# Patient Record
Sex: FEMALE | Race: WHITE | NOT HISPANIC OR LATINO | Employment: FULL TIME | URBAN - METROPOLITAN AREA
[De-identification: names, ages, dates, MRNs, and addresses within clinical notes are randomized per-mention and may not be internally consistent; named-entity substitution may affect disease eponyms.]

---

## 2017-02-09 ENCOUNTER — GENERIC CONVERSION - ENCOUNTER (OUTPATIENT)
Dept: OTHER | Facility: OTHER | Age: 57
End: 2017-02-09

## 2017-02-22 ENCOUNTER — ALLSCRIPTS OFFICE VISIT (OUTPATIENT)
Dept: OTHER | Facility: OTHER | Age: 57
End: 2017-02-22

## 2017-03-22 ENCOUNTER — GENERIC CONVERSION - ENCOUNTER (OUTPATIENT)
Dept: OTHER | Facility: OTHER | Age: 57
End: 2017-03-22

## 2017-05-08 ENCOUNTER — GENERIC CONVERSION - ENCOUNTER (OUTPATIENT)
Dept: OTHER | Facility: OTHER | Age: 57
End: 2017-05-08

## 2017-05-16 ENCOUNTER — ALLSCRIPTS OFFICE VISIT (OUTPATIENT)
Dept: OTHER | Facility: OTHER | Age: 57
End: 2017-05-16

## 2017-11-29 DIAGNOSIS — Z12.31 ENCOUNTER FOR SCREENING MAMMOGRAM FOR MALIGNANT NEOPLASM OF BREAST: ICD-10-CM

## 2017-12-27 ENCOUNTER — GENERIC CONVERSION - ENCOUNTER (OUTPATIENT)
Dept: OTHER | Facility: OTHER | Age: 57
End: 2017-12-27

## 2017-12-27 ENCOUNTER — LAB REQUISITION (OUTPATIENT)
Dept: LAB | Facility: HOSPITAL | Age: 57
End: 2017-12-27
Payer: COMMERCIAL

## 2017-12-27 DIAGNOSIS — E03.9 HYPOTHYROIDISM: ICD-10-CM

## 2017-12-27 LAB
ALBUMIN SERPL BCP-MCNC: 3.9 G/DL (ref 3.5–5)
ALP SERPL-CCNC: 41 U/L (ref 46–116)
ALT SERPL W P-5'-P-CCNC: 20 U/L (ref 12–78)
ANION GAP SERPL CALCULATED.3IONS-SCNC: 4 MMOL/L (ref 4–13)
AST SERPL W P-5'-P-CCNC: 11 U/L (ref 5–45)
BASOPHILS # BLD AUTO: 0.02 THOUSANDS/ΜL (ref 0–0.1)
BASOPHILS NFR BLD AUTO: 0 % (ref 0–1)
BILIRUB SERPL-MCNC: 0.62 MG/DL (ref 0.2–1)
BUN SERPL-MCNC: 11 MG/DL (ref 5–25)
CALCIUM SERPL-MCNC: 8.8 MG/DL (ref 8.3–10.1)
CHLORIDE SERPL-SCNC: 105 MMOL/L (ref 100–108)
CHOLEST SERPL-MCNC: 141 MG/DL (ref 50–200)
CO2 SERPL-SCNC: 30 MMOL/L (ref 21–32)
CREAT SERPL-MCNC: 0.69 MG/DL (ref 0.6–1.3)
EOSINOPHIL # BLD AUTO: 0.08 THOUSAND/ΜL (ref 0–0.61)
EOSINOPHIL NFR BLD AUTO: 1 % (ref 0–6)
ERYTHROCYTE [DISTWIDTH] IN BLOOD BY AUTOMATED COUNT: 13.2 % (ref 11.6–15.1)
GFR SERPL CREATININE-BSD FRML MDRD: 98 ML/MIN/1.73SQ M
GLUCOSE SERPL-MCNC: 80 MG/DL (ref 65–140)
HCT VFR BLD AUTO: 42.3 % (ref 34.8–46.1)
HDLC SERPL-MCNC: 67 MG/DL (ref 40–60)
HGB BLD-MCNC: 13.9 G/DL (ref 11.5–15.4)
LDLC SERPL CALC-MCNC: 62 MG/DL (ref 0–100)
LYMPHOCYTES # BLD AUTO: 1.02 THOUSANDS/ΜL (ref 0.6–4.47)
LYMPHOCYTES NFR BLD AUTO: 16 % (ref 14–44)
MCH RBC QN AUTO: 29.5 PG (ref 26.8–34.3)
MCHC RBC AUTO-ENTMCNC: 32.9 G/DL (ref 31.4–37.4)
MCV RBC AUTO: 90 FL (ref 82–98)
MONOCYTES # BLD AUTO: 0.48 THOUSAND/ΜL (ref 0.17–1.22)
MONOCYTES NFR BLD AUTO: 7 % (ref 4–12)
NEUTROPHILS # BLD AUTO: 4.88 THOUSANDS/ΜL (ref 1.85–7.62)
NEUTS SEG NFR BLD AUTO: 76 % (ref 43–75)
NRBC BLD AUTO-RTO: 0 /100 WBCS
PLATELET # BLD AUTO: 196 THOUSANDS/UL (ref 149–390)
PMV BLD AUTO: 12.7 FL (ref 8.9–12.7)
POTASSIUM SERPL-SCNC: 4 MMOL/L (ref 3.5–5.3)
PROT SERPL-MCNC: 6.8 G/DL (ref 6.4–8.2)
RBC # BLD AUTO: 4.71 MILLION/UL (ref 3.81–5.12)
SODIUM SERPL-SCNC: 139 MMOL/L (ref 136–145)
TRIGL SERPL-MCNC: 59 MG/DL
TSH SERPL DL<=0.05 MIU/L-ACNC: 0.77 UIU/ML (ref 0.36–3.74)
WBC # BLD AUTO: 6.5 THOUSAND/UL (ref 4.31–10.16)

## 2017-12-27 PROCEDURE — 80053 COMPREHEN METABOLIC PANEL: CPT | Performed by: FAMILY MEDICINE

## 2017-12-27 PROCEDURE — 85025 COMPLETE CBC W/AUTO DIFF WBC: CPT | Performed by: FAMILY MEDICINE

## 2017-12-27 PROCEDURE — 84443 ASSAY THYROID STIM HORMONE: CPT | Performed by: FAMILY MEDICINE

## 2017-12-27 PROCEDURE — 80061 LIPID PANEL: CPT | Performed by: FAMILY MEDICINE

## 2018-01-11 NOTE — MISCELLANEOUS
Message   Recorded as Task   Date: 06/22/2016 10:08 AM, Created By: [facility]   Task Name: Follow Up   Assigned To: Michelle Alvares   Regarding Patient: Olga Romero, Status: In Progress   Comment:    Ibis Espinosane - 22 Jun 2016 10:08 AM     TASK CREATED  spoke with pt    she is a pt of rk sched for lap vag hysterectomy 08/15    began last night with intense pelvic pain    has 14cm pedunculated fibroid    no c/o bleeding    paged ed    await further instructions    pt has an rx for post op percocet   Ibis Espinosane - 22 Jun 2016 10:08 AM     TASK IN PROGRESS   Mara Espinosa - 22 Jun 2016 10:20 AM     TASK EDITED  spoke with ed and explained the situation    she advised pt start the percocet and layer with motrin 800mg   if pain is not relieved and coontinues to be severe, to go to rellr/Mara Forde - 22 Jun 2016 10:21 AM     TASK REASSIGNED: Previously Assigned To BIJU YOU,Jessika CAMERON TO ED   Mara Espinosa - 22 Jun 2016 10:22 AM     TASK REASSIGNED: Previously Assigned To Anali Quinteros TO RK ON HIS RETURN   Neda Nyhan - 28 Jun 2016 9:49 AM     TASK EDITED  pt called back today- she has had diarrhea since sunday and didnt know if it could be related to her fibroid  please advise [phone]   Mara Espniosa - 28 Jun 2016 10:23 AM     TASK IN PROGRESS   Mara Espinosa - 28 Jun 2016 10:24 AM     TASK EDITED  lm for pt tcb   Ibis Espinosane - 28 Jun 2016 11:07 AM     TASK EDITED  spoke with pt    she was requesting an excuse for work due to diarrhea    referred pt to pcp        Active Problems    1  Acute pharyngitis (462) (J02 9)   2  Anxiety (300 00) (F41 9)   3  Arm paresthesia, right (782 0) (R20 2)   4  Cervical high risk human papillomavirus (HPV) DNA test positive (795 05) (R87 810)   5  Constipation (564 00) (K59 00)   6  Depression (311) (F32 9)   7  Dyspepsia (536 8) (K30)   8   Encounter for routine gynecological examination with Papanicolaou smear of cervix   (V72 31,V76 2) (Z01 419)   9  Encounter for screening colonoscopy (V76 51) (Z12 11)   10  Encounter for screening mammogram for malignant neoplasm of breast (V76 12)    (Z12 31)   11  Exposure to STD (V01 6) (Z20 2)   12  Fatigue (780 79) (R53 83)   13  Leiomyoma of uterus (218 9) (D25 9)   14  Low grade squamous intraepithelial lesion (LGSIL) on Papanicolaou smear of cervix    (795 03) (R87 612)   15  Migraine headache (346 90) (G43 909)   16  Primary hypothyroidism (244 9) (E03 9)   17  Screening for human papillomavirus (HPV) (V73 81) (Z11 51)   18  Symptomatic menopausal or female climacteric states (627 2) (N95 1)   19  Uterine fibroid (218 9) (D25 9)   20  Vaginal discharge (623 5) (N89 8)   21  Viral gastroenteritis (008 8) (A08 4)   22  Vulvovaginitis candida albicans (112 1) (B37 3)    Current Meds   1  Black Cohosh CAPS; Therapy: (Recorded:12Oct2015) to Recorded   2  Butalbital-APAP-Caffeine -40 MG Oral Capsule; Take one capsule every 4 to 6   hours prn  Requested for: 60Pnc7299; Last Rx:44Nsw8791 Ordered   3  Fluticasone Propionate 50 MCG/ACT Nasal Suspension; USE 2 SPRAYS IN EACH   NOSTRIL DAILY; Therapy: 09NMO7142 to (Edmundo Ray)  Requested for: 51Ohl1448 Ordered   4  KlonoPIN 0 5 MG Oral Tablet (ClonazePAM); take 4 tablets at bedtime; Therapy: (Recorded:82Efz5748) to Recorded   5  Minivelle 0 05 MG/24HR Transdermal Patch Twice Weekly; APPLY 1 PATCH TWICE   WEEKLY AS DIRECTED; Therapy: 03Sga1107 to (Joi Ledesma)  Requested for: 77WLA4785; Last   Rx:01Jun2016 Ordered   6  Mupirocin Calcium 2 % External Cream (Bactroban); APPLY AND GENTLY MASSAGE   INTO AFFECTED AREA(S) TWICE DAILY  Requested for: 74Xyq1726; Last   Rx:16Ugb1535 Ordered   7  Norethindrone Acetate 5 MG Oral Tablet; one half tablet at bedtime; Therapy: 20Lvy9634 to (Verito Peterson)  Requested for: 52WQT1672; Last   Rx:07Jun2016 Ordered   8   Ondansetron 8 MG Oral Tablet Dispersible; TAKE 1 TABLET Every 8 hours PRN nausea; Therapy: 02WAY4088 to (Last Rx:24Mar2015); Status: ACTIVE - Renewal Voided Ordered   9  Synthroid 112 MCG Oral Tablet (Levothyroxine Sodium); Take 1 tablet daily; Therapy: 69NHJ0607 to (Evaluate:09Jan2017)  Requested for: 26WXF6153; Last   Rx:15Jan2016 Ordered   10  TraMADol HCl - 50 MG Oral Tablet; TAKE 1 TABLET EVERY 6 HOURS AS NEEDED FOR    PAIN;    Therapy: 50HGB5553 to (Evaluate:24Mar2015); Last Rx:16Mar2015 Ordered   11  Vyvanse 70 MG Oral Capsule Recorded    Allergies    1   No Known Drug Allergies    Signatures   Electronically signed by : Georgina Mayo, ; Jun 28 2016 11:07AM EST                       (Author)

## 2018-01-11 NOTE — MISCELLANEOUS
Message   Recorded as Task   Date: 11/28/2016 12:24 PM, Created By: Jake Coffman   Task Name: Care Coordination   Assigned To: Mami Tracy   Regarding Patient: Brett Mendez, Status: Active   CommentMichelle Lombardi - 28 Nov 2016 12:24 PM     TASK CREATED  Caller: Self; Care Coordination; [phone] (Home)  pt called - has her mammo schd for tomorrow 11/29 @1040am - needs to have a mammo rx faxed to [facility] [phone] - she needs to have this mammo tomorrow - as of 12/1 she will not have insurance  please advise  [address] [place] [address] [place] - 28 Nov 2016 12:54 PM     TASK EDITED  order in allscripts        Active Problems    1  Acute pharyngitis (462) (J02 9)   2  Anxiety (300 00) (F41 9)   3  Arm paresthesia, right (782 0) (R20 2)   4  Cervical high risk human papillomavirus (HPV) DNA test positive (795 05) (R87 810)   5  Constipation (564 00) (K59 00)   6  Contact dermatitis (692 9) (L25 9)   7  Depression (311) (F32 9)   8  Dyspepsia (536 8) (K30)   9  Dysuria (788 1) (R30 0)   10  Encounter for routine gynecological examination with Papanicolaou smear of cervix    (V72 31,V76 2) (Z01 419)   11  Encounter for screening colonoscopy (V76 51) (Z12 11)   12  Encounter for screening mammogram for malignant neoplasm of breast (V76 12)    (Z12 31)   13  Exposure to STD (V01 6) (Z20 2)   14  Fatigue (780 79) (R53 83)   15  Fibroid, uterine (218 9) (D25 9)   16  Gastroenteritis (558 9) (K52 9)   17  Low grade squamous intraepithelial lesion (LGSIL) on Papanicolaou smear of cervix    (795 03) (R87 612)   18  Migraine headache (346 90) (G43 909)   19  Primary hypothyroidism (244 9) (E03 9)   20  Rash (782 1) (R21)   21  Screening for human papillomavirus (HPV) (V73 81) (Z11 51)   22  Symptomatic menopausal or female climacteric states (627 2) (N95 1)   23  Uterine fibroid (218 9) (D25 9)   24  UTI (urinary tract infection) (599 0) (N39 0)   25  Vaginal discharge (623 5) (N89 8)   26   Viral gastroenteritis (008  8) (A08 4)   27  Vulvovaginitis candida albicans (112 1) (B37 3)    Current Meds   1  Butalbital-APAP-Caffeine -40 MG Oral Capsule; Take one capsule every 4 to 6   hours prn  Requested for: 95Hzv4285; Last Rx:82Lcg1671 Ordered   2  Fluticasone Propionate 50 MCG/ACT Nasal Suspension; USE 2 SPRAYS IN EACH   NOSTRIL DAILY; Therapy: 47ORP9985 to (Last Rx:21Suh0027)  Requested for: 06Aul4697 Ordered   3  KlonoPIN 0 5 MG Oral Tablet (ClonazePAM); take 4 tablets at bedtime; Therapy: (Recorded:12Oct2015) to Recorded   4  Minivelle 0 05 MG/24HR Transdermal Patch Twice Weekly; APPLY 1 PATCH TWICE   WEEKLY AS DIRECTED; Therapy: 28Tya5094 to (Usman Coffin)  Requested for: 78BKB6822; Last   Rx:01Jun2016 Ordered   5  Mupirocin Calcium 2 % External Cream (Bactroban); APPLY AND GENTLY MASSAGE   INTO AFFECTED AREA(S) TWICE DAILY  Requested for: 94Qkw4765; Last   Rx:97Ruh9186 Ordered   6  Ondansetron 8 MG Oral Tablet Dispersible; TAKE 1 TABLET Every 8 hours PRN nausea; Therapy: 05RIT6709 to (Last Rx:29Jun2016) Ordered   7  PredniSONE 10 MG Oral Tablet; 5 tabs for 2 days, 4 tabs for two days, three tabs for two   days, two tabs for two days, one tab for two days; Therapy: 65Zrq5964 to (Last Rx:88Mwt0053)  Requested for: 04Btu5535 Ordered   8  Synthroid 112 MCG Oral Tablet (Levothyroxine Sodium); Take 1 tablet daily; Therapy: 58IZP9006 to (Evaluate:09Jan2017)  Requested for: 28BTO1592; Last   Rx:15Jan2016 Ordered   9  TraMADol HCl - 50 MG Oral Tablet; TAKE 1 TABLET EVERY 6 HOURS AS NEEDED FOR   PAIN;   Therapy: 02ZFL3993 to (Evaluate:02Nov2016); Last Rx:88Bbj6300 Ordered   10  Triamcinolone Acetonide 0 5 % External Cream; APPLY TO THE AFFECTED AREAS 3    TIMES DAILY AS NEEDED; Therapy: 34LEJ5605 to (Last Rx:26Ees2245)  Requested for: 52Rin3996 Ordered   11  Vyvanse 70 MG Oral Capsule Recorded    Allergies    1   No Known Drug Allergies    Plan  Encounter for screening mammogram for malignant neoplasm of breast    · * MAMMO SCREENING BILATERAL W CAD; Status:Hold For - Scheduling,Retrospective  By Protocol Authorization;  Requested for:28Nov2016;     Signatures   Electronically signed by : Kinsey Rodriguez, ; Nov 28 2016 12:54PM EST                       (Author)

## 2018-01-11 NOTE — MISCELLANEOUS
Message   Recorded as Task   Date: 05/20/2016 12:53 PM, Created By: Mami Tracy   Task Name: Follow Up   Assigned To: Hiren Grant   Regarding Patient: Brett Mendez, Status: In Progress   Joao Brooks - 20 May 2016 12:53 PM     TASK CREATED  Caller: Self; [phone] (Home); [phone] (Work)  pt on minvalle patches,0 375 still getting night swets wants 90 day supply mail order can she get a higher dose? pt having a hysterectomy june 13, [phone]   Geni Link - 20 May 2016 1:40 PM     TASK REASSIGNED: Previously Assigned To martha Dean Res - 20 May 2016 2:18 PM     TASK REASSIGNED: Previously Assigned To Grace Wick - 20 May 2016 2:24 PM     TASK IN [address] [facility] - 20 May 2016 2:30 PM     TASK EDITED  pt states 2 weeks ago she was having hotmultiple hot flashes, more frequently  felt one coming this morning  wanted to know if she can get higher dose of miniville patch  is scheduled for hysterectomy in a month  Active Problems    1  Acute pharyngitis (462) (J02 9)   2  Anxiety (300 00) (F41 9)   3  Arm paresthesia, right (782 0) (R20 2)   4  Cervical high risk human papillomavirus (HPV) DNA test positive (795 05) (R87 810)   5  Constipation (564 00) (K59 00)   6  Depression (311) (F32 9)   7  Dyspepsia (536 8) (K30)   8  Encounter for routine gynecological examination with Papanicolaou smear of cervix   (V72 31,V76 2) (Z01 419)   9  Encounter for screening colonoscopy (V76 51) (Z12 11)   10  Encounter for screening mammogram for malignant neoplasm of breast (V76 12)    (Z12 31)   11  Exposure to STD (V01 6) (Z20 2)   12  Fatigue (780 79) (R53 83)   13  Leiomyoma of uterus (218 9) (D25 9)   14  Low grade squamous intraepithelial lesion (LGSIL) on Papanicolaou smear of cervix    (795 03) (R87 612)   15  Migraine headache (346 90) (G43 909)   16  Primary hypothyroidism (244 9) (E03 9)   17   Screening for human papillomavirus (HPV) (Z37 95) (Z11 51)   18  Symptomatic menopausal or female climacteric states (627 2) (N95 1)   19  Uterine fibroid (218 9) (D25 9)   20  Vaginal discharge (623 5) (N89 8)   21  Viral gastroenteritis (008 8) (A08 4)   22  Vulvovaginitis candida albicans (112 1) (B37 3)    Current Meds   1  Black Cohosh CAPS; Therapy: (Recorded:12Oct2015) to Recorded   2  Butalbital-APAP-Caffeine -40 MG Oral Capsule; Take one capsule every 4 to 6   hours prn  Requested for: 68Mof8562; Last Rx:10Yvl0799 Ordered   3  Fluticasone Propionate 50 MCG/ACT Nasal Suspension; USE 2 SPRAYS IN EACH   NOSTRIL DAILY; Therapy: 95IWY8090 to (Last Jennifer Turpin)  Requested for: 69Nmk6201 Ordered   4  KlonoPIN 0 5 MG Oral Tablet (ClonazePAM); take 4 tablets at bedtime; Therapy: (Recorded:12Oct2015) to Recorded   5  Minivelle 0 0375 MG/24HR Transdermal Patch Twice Weekly; APPLY 1 PATCH TWICE   WEEKLY AS DIRECTED; Therapy: 61Abm0092 to (Evaluate:18Jun2016)  Requested for: 60Cld1810; Last   Rx:92Yye3858 Ordered   6  Mupirocin Calcium 2 % External Cream (Bactroban); APPLY AND GENTLY MASSAGE   INTO AFFECTED AREA(S) TWICE DAILY  Requested for: 52Pvb5623; Last   Rx:30Wsk4807 Ordered   7  Norethindrone Acetate 5 MG Oral Tablet; one half tablet at bedtime; Therapy: 41Ryb2084 to (Noa Oats)  Requested for: 16RQA6078; Last   Rx:59Hcy9143 Ordered   8  Omeprazole 20 MG Oral Capsule Delayed Release; TAKE 1 CAPSULE DAILY; Therapy: 80HHH6670 to (Noa Oats)  Requested for: 38LME9588; Last   Rx:98Gej5837 Ordered   9  Ondansetron 8 MG Oral Tablet Dispersible; TAKE 1 TABLET Every 8 hours PRN nausea; Therapy: 01PYW9862 to (Last Rx:24Mar2015); Status: ACTIVE - Renewal Voided Ordered   10  Synthroid 112 MCG Oral Tablet (Levothyroxine Sodium); Take 1 tablet daily; Therapy: 34ZYO4498 to (Evaluate:09Jan2017)  Requested for: 53LDW1570; Last    Rx:15Jan2016 Ordered   11   TraMADol HCl - 50 MG Oral Tablet; TAKE 1 TABLET EVERY 6 HOURS AS NEEDED FOR PAIN;    Therapy: 51VRS6778 to (Evaluate:24Mar2015); Last Rx:16Mar2015 Ordered   12  Vyvanse 50 MG Oral Capsule; take 1/2 tablet twice daily; Therapy: (Recorded:12Oct2015) to Recorded    Allergies    1   No Known Drug Allergies    Signatures   Electronically signed by : Avel Favre, LPN; May 20 6459  5:70IB EST                       (Author)

## 2018-01-11 NOTE — PROGRESS NOTES
Assessment    1  Encounter for preventive health examination (V70 0) (Z00 00)    Discussion/Summary    Lipid panel and A1c done today  Await results  Anticipatory guidance provided  Chief Complaint  PE      History of Present Illness  HPI: Patient is here today for annual well check  She is due for hysterectomy for uterine fibroids later this month  She does follow with her gynecologist regularly  She generally feeling well  Review of Systems    Constitutional: No fever, no chills, feels well, no tiredness, no recent weight gain or weight loss  Eyes: No complaints of eye pain, no red eyes, no eyesight problems, no discharge, no dry eyes, no itching of eyes  ENT: no complaints of earache, no loss of hearing, no nose bleeds, no nasal discharge, no sore throat, no hoarseness  Cardiovascular: No complaints of slow heart rate, no fast heart rate, no chest pain, no palpitations, no leg claudication, no lower extremity edema  Respiratory: No complaints of shortness of breath, no wheezing, no cough, no SOB on exertion, no orthopnea, no PND  Gastrointestinal: No complaints of abdominal pain, no constipation, no nausea or vomiting, no diarrhea, no bloody stools  Genitourinary: No complaints of dysuria, no incontinence, no pelvic pain, no dysmenorrhea, no vaginal discharge or bleeding  Musculoskeletal: No complaints of arthralgias, no myalgias, no joint swelling or stiffness, no limb pain or swelling  Integumentary: No complaints of skin rash or lesions, no itching, no skin wounds, no breast pain or lump  Neurological: No complaints of headache, no confusion, no convulsions, no numbness, no dizziness or fainting, no tingling, no limb weakness, no difficulty walking  Psychiatric: Not suicidal, no sleep disturbance, no anxiety or depression, no change in personality, no emotional problems     Endocrine: No complaints of proptosis, no hot flashes, no muscle weakness, no deepening of the voice, no feelings of weakness  Hematologic/Lymphatic: No complaints of swollen glands, no swollen glands in the neck, does not bleed easily, does not bruise easily  Active Problems    1  Acute pharyngitis (462) (J02 9)   2  Anxiety (300 00) (F41 9)   3  Arm paresthesia, right (782 0) (R20 2)   4  Cervical high risk human papillomavirus (HPV) DNA test positive (795 05) (R87 810)   5  Constipation (564 00) (K59 00)   6  Depression (311) (F32 9)   7  Dyspepsia (536 8) (K30)   8  Encounter for routine gynecological examination with Papanicolaou smear of cervix   (V72 31,V76 2) (Z01 419)   9  Encounter for screening colonoscopy (V76 51) (Z12 11)   10  Encounter for screening mammogram for malignant neoplasm of breast (V76 12)    (Z12 31)   11  Exposure to STD (V01 6) (Z20 2)   12  Fatigue (780 79) (R53 83)   13  Gastroenteritis (558 9) (K52 9)   14  Leiomyoma of uterus (218 9) (D25 9)   15  Low grade squamous intraepithelial lesion (LGSIL) on Papanicolaou smear of cervix    (795 03) (R87 612)   16  Migraine headache (346 90) (G43 909)   17  Primary hypothyroidism (244 9) (E03 9)   18  Screening for human papillomavirus (HPV) (V73 81) (Z11 51)   19  Symptomatic menopausal or female climacteric states (627 2) (N95 1)   20  Uterine fibroid (218 9) (D25 9)   21  Vaginal discharge (623 5) (N89 8)   22  Viral gastroenteritis (008 8) (A08 4)   23   Vulvovaginitis candida albicans (112 1) (B37 3)    Past Medical History    · History of Acute upper respiratory infection (465 9) (J06 9)   · History of Anxiety (300 00) (F41 9)   · History of Clear Vaginal Discharge (623 5)   · History of Depression (311) (F32 9)   · History of Encounter for routine gynecological examination (V72 31) (Z01 419)   · History of Fibromyalgia (729 1) (M79 7)   · History of abdominal pain (V13 89) (B43 294)   · History of acute sinusitis (V12 69) (Z87 09)   · History of allergic rhinitis (V12 69) (Z87 09)   · History of dehydration (V12 29) (Z86 39)   · History of gastroenteritis (V12 79) (Z87 19)   · History of hypothyroidism (V12 29) (Z86 39)   · History of sleep disturbance (V13 89) (U25 024)   · History of thyroid disease (V12 29) (Z86 39)   · History of viral infection (V12 09) (Z86 19)   · History of Lightheadedness (780 4) (R42)   · History of Urinary Tract Infection (V13 02)    Surgical History    · History of Left Breast Biopsy During Breast Surgery   · History of Nasal Septal Deviation Repair   · History of Tubal Ligation    Family History  Mother    · No pertinent family history  Family History    · Family history of Anxiety (Symptom)   · Family history of Depression   · Family history of Heart Disease (V17 49)   · Family history of Hypertension (V17 49)   · Family history of Respiratory Disorder   · Family history of Sleep Disturbances   · Family history of Varicose Veins Of Lower Extremities    Social History    · Being A Social Drinker   · Caffeine Use   · Former smoker (V15 82) (P94 361)    Current Meds   1  Butalbital-APAP-Caffeine -40 MG Oral Capsule; Take one capsule every 4 to 6   hours prn  Requested for: 42Epm3747; Last Rx:07Tbq4241 Ordered   2  Fluticasone Propionate 50 MCG/ACT Nasal Suspension; USE 2 SPRAYS IN EACH   NOSTRIL DAILY; Therapy: 01TOW2756 to (Last Rx:13Mgx3583)  Requested for: 06Dcf9131 Ordered   3  KlonoPIN 0 5 MG Oral Tablet; take 4 tablets at bedtime; Therapy: (Recorded:44Iik1394) to Recorded   4  Minivelle 0 05 MG/24HR Transdermal Patch Twice Weekly; APPLY 1 PATCH TWICE   WEEKLY AS DIRECTED; Therapy: 99Dnb7706 to (Bob Russ)  Requested for: 71JIN1745; Last   Rx:47Loq4367 Ordered   5  Mupirocin Calcium 2 % External Cream; APPLY AND GENTLY MASSAGE INTO   AFFECTED AREA(S) TWICE DAILY  Requested for: 76Ecq2487; Last Rx:91Axw9701   Ordered   6  Ondansetron 8 MG Oral Tablet Dispersible; TAKE 1 TABLET Every 8 hours PRN nausea; Therapy: 41CFY2042 to (Last Rx:29Jun2016) Ordered   7   Oxycodone-Acetaminophen 5-325 MG Oral Tablet; TAKE 1 TABLET EVERY 6 HOURS   AS NEEDED FOR PAIN;   Therapy: 41NHI1559 to (Evaluate:29Jul2016); Last Rx:21Jul2016 Ordered   8  Synthroid 112 MCG Oral Tablet; Take 1 tablet daily; Therapy: 30LJE7175 to (Evaluate:09Jan2017)  Requested for: 46IYL5075; Last   Rx:15Jan2016 Ordered   9  TraMADol HCl - 50 MG Oral Tablet; TAKE 1 TABLET EVERY 6 HOURS AS NEEDED FOR   PAIN;   Therapy: 97BMB5601 to (Evaluate:24Mar2015); Last Rx:16Mar2015 Ordered   10  Vyvanse 70 MG Oral Capsule Recorded    Allergies    1  No Known Drug Allergies    Vitals   Recorded: 03VDD6511 49:46HQ   Systolic 849   Diastolic 62   Temperature 97 9 F   Height 5 ft 3 in   Weight 133 lb    BMI Calculated 23 56   BSA Calculated 1 63     Physical Exam    Constitutional   General appearance: No acute distress, well appearing and well nourished  Eyes   Conjunctiva and lids: No swelling, erythema or discharge  Pupils and irises: Equal, round and reactive to light  Ears, Nose, Mouth, and Throat   External inspection of ears and nose: Normal     Otoscopic examination: Tympanic membranes translucent with normal light reflex  Canals patent without erythema  Oropharynx: Normal with no erythema, edema, exudate or lesions  Pulmonary   Respiratory effort: No increased work of breathing or signs of respiratory distress  Auscultation of lungs: Clear to auscultation  Cardiovascular   Palpation of heart: Normal PMI, no thrills  Auscultation of heart: Normal rate and rhythm, normal S1 and S2, without murmurs  Examination of extremities for edema and/or varicosities: Normal     Abdomen   Abdomen: Non-tender, no masses  Liver and spleen: No hepatomegaly or splenomegaly  Lymphatic   Palpation of lymph nodes in neck: No lymphadenopathy  Musculoskeletal   Gait and station: Normal     Digits and nails: Normal without clubbing or cyanosis      Inspection/palpation of joints, bones, and muscles: Normal     Skin   Skin and subcutaneous tissue: Normal without rashes or lesions  Neurologic   Cranial nerves: Cranial nerves 2-12 intact  Reflexes: 2+ and symmetric  Sensation: No sensory loss  Psychiatric   Orientation to person, place, and time: Normal     Mood and affect: Normal        Health Management  Screening for human papillomavirus (HPV)   (1) THIN PREP PAP FOLLOW UP WITH IMAGING; every 3 years; Last 99Fmj5444; Next  Due: 70KNV6799;  Overdue    Future Appointments    Date/Time Provider Specialty Site   08/15/2016 01:00 PM Judie Whitaker DO Obstetrics/Gynecology [facility]   10/12/2016 11:00 AM Judie Whitaker DO Obstetrics/Gynecology [facility]     Signatures   Electronically signed by : Idania Figueroa DO; Aug  3 2016 12:05PM EST                       (Author)

## 2018-01-11 NOTE — MISCELLANEOUS
Message   Recorded as Task   Date: 06/22/2016 10:08 AM, Created By: [facility]   Task Name: Follow Up   Assigned To: Tamia Alvarez   Regarding Patient: Tristan Cameron, Status: In Progress   Comment:    Mara Espinosa - 22 Jun 2016 10:08 AM     TASK CREATED  spoke with pt    she is a pt of [facility] for lap vag hysterectomy 08/15    began last night with intense pelvic pain    has 14cm pedunculated fibroid    no c/o bleeding    paged ed    await further instructions    pt has an rx for post op percocet   Mara Espinosa - 22 Jun 2016 10:08 AM     TASK IN PROGRESS   Mara Espinosa - 22 Jun 2016 10:20 AM     TASK EDITED  spoke with ed and explained the situation    she advised pt start the percocet and layer with motrin 800mg   if pain is not relieved and coontinues to be severe, to go to [facility]    Active Problems    1  Acute pharyngitis (462) (J02 9)   2  Anxiety (300 00) (F41 9)   3  Arm paresthesia, right (782 0) (R20 2)   4  Cervical high risk human papillomavirus (HPV) DNA test positive (795 05) (R87 810)   5  Constipation (564 00) (K59 00)   6  Depression (311) (F32 9)   7  Dyspepsia (536 8) (K30)   8  Encounter for routine gynecological examination with Papanicolaou smear of cervix   (V72 31,V76 2) (Z01 419)   9  Encounter for screening colonoscopy (V76 51) (Z12 11)   10  Encounter for screening mammogram for malignant neoplasm of breast (V76 12)    (Z12 31)   11  Exposure to STD (V01 6) (Z20 2)   12  Fatigue (780 79) (R53 83)   13  Leiomyoma of uterus (218 9) (D25 9)   14  Low grade squamous intraepithelial lesion (LGSIL) on Papanicolaou smear of cervix    (795 03) (R87 612)   15  Migraine headache (346 90) (G43 909)   16  Primary hypothyroidism (244 9) (E03 9)   17  Screening for human papillomavirus (HPV) (V73 81) (Z11 51)   18  Symptomatic menopausal or female climacteric states (627 2) (N95 1)   19  Uterine fibroid (218 9) (D25 9)   20  Vaginal discharge (623 5) (N89 8)   21   Viral gastroenteritis (008 8) (A08 4)   22  Vulvovaginitis candida albicans (112 1) (B37 3)    Current Meds   1  Black Cohosh CAPS; Therapy: (Recorded:12Oct2015) to Recorded   2  Butalbital-APAP-Caffeine -40 MG Oral Capsule; Take one capsule every 4 to 6   hours prn  Requested for: 01Yst8975; Last Rx:84Vyx5274 Ordered   3  Fluticasone Propionate 50 MCG/ACT Nasal Suspension; USE 2 SPRAYS IN EACH   NOSTRIL DAILY; Therapy: 90LTK4559 to (Last Gilford Butler)  Requested for: 31Qgb0960 Ordered   4  KlonoPIN 0 5 MG Oral Tablet (ClonazePAM); take 4 tablets at bedtime; Therapy: (Recorded:12Oct2015) to Recorded   5  Minivelle 0 05 MG/24HR Transdermal Patch Twice Weekly; APPLY 1 PATCH TWICE   WEEKLY AS DIRECTED; Therapy: 12Sep2015 to (Sarmad Escobedo)  Requested for: 85ZDS2441; Last   Rx:01Jun2016 Ordered   6  Mupirocin Calcium 2 % External Cream (Bactroban); APPLY AND GENTLY MASSAGE   INTO AFFECTED AREA(S) TWICE DAILY  Requested for: 16Apr2015; Last   Rx:16Apr2015 Ordered   7  Norethindrone Acetate 5 MG Oral Tablet; one half tablet at bedtime; Therapy: 12Sep2015 to (Liban Boudreaux)  Requested for: 58TVM3805; Last   Rx:07Jun2016 Ordered   8  Ondansetron 8 MG Oral Tablet Dispersible; TAKE 1 TABLET Every 8 hours PRN nausea; Therapy: 92FLA2069 to (Last Rx:24Mar2015); Status: ACTIVE - Renewal Voided Ordered   9  Synthroid 112 MCG Oral Tablet (Levothyroxine Sodium); Take 1 tablet daily; Therapy: 56MKP0307 to (Evaluate:09Jan2017)  Requested for: 46ZNR8766; Last   Rx:15Jan2016 Ordered   10  TraMADol HCl - 50 MG Oral Tablet; TAKE 1 TABLET EVERY 6 HOURS AS NEEDED FOR    PAIN;    Therapy: 53XPX2259 to (Evaluate:24Mar2015); Last Rx:16Mar2015 Ordered   11  Vyvanse 70 MG Oral Capsule Recorded    Allergies    1   No Known Drug Allergies    Signatures   Electronically signed by : Livan Florence, ; Jun 22 2016 10:21AM EST                       (Author)

## 2018-01-11 NOTE — RESULT NOTES
Message  ppd placed 2-7-19  ppd read 2-9-19  negative -0-mm     Plan  Health Maintenance    · PPD    Signatures   Electronically signed by : Linsey Cuadra;  Feb 9 2017  3:26PM EST                       (Author)

## 2018-01-12 VITALS
WEIGHT: 136 LBS | BODY MASS INDEX: 24.1 KG/M2 | DIASTOLIC BLOOD PRESSURE: 84 MMHG | SYSTOLIC BLOOD PRESSURE: 140 MMHG | HEIGHT: 63 IN

## 2018-01-12 NOTE — PROGRESS NOTES
Assessment    1  Encounter for preventive health examination (V70 0) (Z00 00)    Discussion/Summary    Rec: Derm eval of nodular lesion recommendted to R/O basal cell  Form completed for employer  Chief Complaint  physical      History of Present Illness  HM, Adult Female: The patient is being seen for a health maintenance evaluation  General Health: The patient's health since the last visit is described as good  She has regular dental visits  She denies vision problems  She denies hearing loss  Immunizations status: up to date  Lifestyle:  She consumes a diverse and healthy diet  She does not have any weight concerns  She exercises regularly  She does not use tobacco  She denies alcohol use  She denies drug use  Reproductive health:  she reports normal menses  Screening: cancer screening reviewed and current  metabolic screening reviewed and current  risk screening reviewed and current  Review of Systems    Constitutional: No fever, no chills, feels well, no tiredness, no recent weight gain or weight loss  Eyes: No complaints of eye pain, no red eyes, no eyesight problems, no discharge, no dry eyes, no itching of eyes  ENT: no complaints of earache, no loss of hearing, no nose bleeds, no nasal discharge, no sore throat, no hoarseness  Cardiovascular: No complaints of slow heart rate, no fast heart rate, no chest pain, no palpitations, no leg claudication, no lower extremity edema  Respiratory: No complaints of shortness of breath, no wheezing, no cough, no SOB on exertion, no orthopnea, no PND  Gastrointestinal: No complaints of abdominal pain, no constipation, no nausea or vomiting, no diarrhea, no bloody stools  Genitourinary: No complaints of dysuria, no incontinence, no pelvic pain, no dysmenorrhea, no vaginal discharge or bleeding  Musculoskeletal: No complaints of arthralgias, no myalgias, no joint swelling or stiffness, no limb pain or swelling     Integumentary: No complaints of skin rash or lesions, no itching, no skin wounds, no breast pain or lump  Neurological: No complaints of headache, no confusion, no convulsions, no numbness, no dizziness or fainting, no tingling, no limb weakness, no difficulty walking  Psychiatric: Not suicidal, no sleep disturbance, no anxiety or depression, no change in personality, no emotional problems  Endocrine: No complaints of proptosis, no hot flashes, no muscle weakness, no deepening of the voice, no feelings of weakness  Hematologic/Lymphatic: No complaints of swollen glands, no swollen glands in the neck, does not bleed easily, does not bruise easily  Active Problems    1  Anxiety (300 00) (F41 9)   2  Arm paresthesia, right (782 0) (R20 2)   3  Cervical high risk human papillomavirus (HPV) DNA test positive (795 05) (R87 810)   4  Constipation (564 00) (K59 00)   5  Contact dermatitis (692 9) (L25 9)   6  Depression (311) (F32 9)   7  Dyspepsia (536 8) (K30)   8  Dysuria (788 1) (R30 0)   9  Encounter for routine gynecological examination with Papanicolaou smear of cervix   (V72 31,V76 2) (Z01 419)   10  Encounter for screening colonoscopy (V76 51) (Z12 11)   11  Encounter for screening mammogram for malignant neoplasm of breast (V76 12)    (Z12 31)   12  Exposure to STD (V01 6) (Z20 2)   13  Fibroid, uterine (218 9) (D25 9)   14  Primary hypothyroidism (244 9) (E03 9)   15  Rash (782 1) (R21)   16  Symptomatic menopausal or female climacteric states (627 2) (N95 1)   17  Uterine fibroid (218 9) (D25 9)   18   Vulvovaginitis candida albicans (112 1) (B37 3)    Past Medical History    · History of Acute upper respiratory infection (465 9) (J06 9)   · History of Anxiety (300 00) (F41 9)   · History of Clear Vaginal Discharge (623 5)   · History of Depression (311) (F32 9)   · History of Encounter for routine gynecological examination (V72 31) (Z01 419)   · History of Fibromyalgia (729 1) (M79 7)   · History of abdominal pain (V13 89) (Z87 898)   · History of acute pharyngitis (V12 69) (Z87 09)   · History of acute sinusitis (V12 69) (Z87 09)   · History of allergic rhinitis (V12 69) (Z87 09)   · History of dehydration (V12 29) (Z86 39)   · History of fatigue (V13 89) (Z99 044)   · History of gastroenteritis (V12 79) (Z87 19)   · History of gastroenteritis (V12 79) (Z87 19)   · History of hypothyroidism (V12 29) (Z86 39)   · History of migraine (V12 49) (Z86 69)   · History of sleep disturbance (V13 89) (W40 572)   · History of thyroid disease (V12 29) (Z86 39)   · History of urinary tract infection (V13 02) (Z87 440)   · History of vaginal discharge (V13 29) (Z87 42)   · History of viral gastroenteritis (V12 09) (Z86 19)   · History of viral infection (V12 09) (Z86 19)   · History of Lightheadedness (780 4) (R42)   · History of Low grade squamous intraepithelial lesion (LGSIL) on Papanicolaou smear of  cervix (795 03) (R87 612)   · History of Screening for human papillomavirus (HPV) (V73 81) (Z11 51)   · History of Urinary Tract Infection (V13 02)    Surgical History    · History of Left Breast Biopsy During Breast Surgery   · History of Nasal Septal Deviation Repair   · History of Tubal Ligation    Family History  Mother    · No pertinent family history  Family History    · Family history of Anxiety (Symptom)   · Family history of Depression   · Family history of Heart Disease (V17 49)   · Family history of Hypertension (V17 49)   · Family history of Respiratory Disorder   · Family history of Sleep Disturbances   · Family history of Varicose Veins Of Lower Extremities    Social History    · Being A Social Drinker   · Caffeine Use   · Former smoker (V15 82) (W31 582)    Current Meds   1  Butalbital-APAP-Caffeine -40 MG Oral Capsule; Take one capsule every 4 to 6   hours prn  Requested for: 62Pch5898; Last Rx:91Kuh1706 Ordered   2   Estradiol 0 05 MG/24HR Transdermal Patch Twice Weekly; APPLY 1 PATCH TWICE   WEEKLY AS DIRECTED; Therapy: 64HLS7374 to (Evaluate:20Myh1946)  Requested for: 52LRO6611; Last   Rx:12Zzk3447 Ordered   3  Fluticasone Propionate 50 MCG/ACT Nasal Suspension; USE 2 SPRAYS IN EACH   NOSTRIL DAILY; Therapy: 87PFZ3521 to (Edmundo Agustin)  Requested for: 56Xzf1529 Ordered   4  KlonoPIN 0 5 MG Oral Tablet; take 4 tablets at bedtime; Therapy: (Recorded:73Zse4545) to Recorded   5  Minivelle 0 05 MG/24HR Transdermal Patch Twice Weekly; APPLY 1 PATCH TWICE   WEEKLY AS DIRECTED; Therapy: 38Aem5892 to (Emelia Stacy)  Requested for: 91NAN8186; Last   Rx:37Vmn0631 Ordered   6  Mupirocin Calcium 2 % External Cream; APPLY AND GENTLY MASSAGE INTO   AFFECTED AREA(S) TWICE DAILY  Requested for: 67Xme8854; Last Rx:20Mvk6787   Ordered   7  Ondansetron 8 MG Oral Tablet Dispersible; TAKE 1 TABLET Every 8 hours PRN nausea; Therapy: 89NBF3260 to (Last Rx:98Kjh4972) Ordered   8  PredniSONE 10 MG Oral Tablet; 5 tabs for 2 days, 4 tabs for two days, three tabs for two   days, two tabs for two days, one tab for two days; Therapy: 82Bxm8678 to (Last Rx:18Acs3690)  Requested for: 04Slq9403 Ordered   9  Synthroid 112 MCG Oral Tablet; Take 1 tablet daily; Therapy: 10LUY8651 to (Evaluate:05Jan2018)  Requested for: 65LNC4194; Last   Rx:11Jan2017 Ordered   10  TraMADol HCl - 50 MG Oral Tablet; TAKE 1 TABLET EVERY 6 HOURS AS NEEDED FOR    PAIN;    Therapy: 33TJO1399 to (Evaluate:02Nov2016); Last Rx:64Bzg5892 Ordered   11  Triamcinolone Acetonide 0 5 % External Cream; APPLY TO THE AFFECTED AREAS 3    TIMES DAILY AS NEEDED; Therapy: 63SQZ3297 to (Last Rx:58Jqv4137)  Requested for: 41Vpm4510 Ordered   12  Vyvanse 70 MG Oral Capsule Recorded    Allergies    1   No Known Drug Allergies    Vitals   Recorded: 31Qmm6205 01:26PM   Temperature 98 2 F   Systolic 211   Diastolic 62   Height 5 ft 3 in   Weight 130 lb    BMI Calculated 23 03   BSA Calculated 1 61     Physical Exam    Constitutional   General appearance: No acute distress, well appearing and well nourished  Eyes   Conjunctiva and lids: No swelling, erythema or discharge  Pupils and irises: Equal, round and reactive to light  Ears, Nose, Mouth, and Throat   External inspection of ears and nose: Normal     Otoscopic examination: Tympanic membranes translucent with normal light reflex  Canals patent without erythema  Oropharynx: Normal with no erythema, edema, exudate or lesions  Pulmonary   Respiratory effort: No increased work of breathing or signs of respiratory distress  Auscultation of lungs: Clear to auscultation  Cardiovascular   Palpation of heart: Normal PMI, no thrills  Auscultation of heart: Normal rate and rhythm, normal S1 and S2, without murmurs  Examination of extremities for edema and/or varicosities: Normal     Abdomen   Abdomen: Non-tender, no masses  Liver and spleen: No hepatomegaly or splenomegaly  Lymphatic   Palpation of lymph nodes in neck: No lymphadenopathy  Musculoskeletal   Gait and station: Normal     Digits and nails: Normal without clubbing or cyanosis  Inspection/palpation of joints, bones, and muscles: Normal     Skin   Skin and subcutaneous tissue: Abnormal   1cm raised nodular lesion to left anterior thigh  Neurologic   Cranial nerves: Cranial nerves 2-12 intact  Reflexes: 2+ and symmetric  Sensation: No sensory loss  Psychiatric   Orientation to person, place, and time: Normal     Mood and affect: Normal        Health Management  History of Screening for human papillomavirus (HPV)   (1) THIN PREP PAP FOLLOW UP WITH IMAGING; every 3 years; Last 36UCC6122; Next  Due: 44Wbc5734;  Overdue    Future Appointments    Date/Time Provider Specialty Site   03/22/2017 09:40 AM Jcarlos De La Cruz DO Obstetrics/Gynecology [facility]     Signatures   Electronically signed by : Merlin Rowe DO; Feb 27 2017  8:25AM EST                       (Author)

## 2018-01-12 NOTE — MISCELLANEOUS
Message  pt informed UA looks positive  Heladio De Dios [address] for Macrobid      Plan  UTI (urinary tract infection)    · Nitrofurantoin Macrocrystal 100 MG Oral Capsule; take 1 tablet twice daily times 7  days   · Nitrofurantoin Monohyd Macro 100 MG Oral Capsule (Macrobid); take 1 capsule  twice daily times 7 days    Signatures   Electronically signed by : Jessica Lacey DO; Aug 31 2016 10:36AM EST                       (Author)

## 2018-01-12 NOTE — MISCELLANEOUS
Message   Recorded as Task   Date: 06/01/2016 01:46 PM, Created By: Stan Glez   Task Name: Med Renewal Request   Assigned To: Lakeshia Ron   Regarding Patient: Ravindra Myers, Status: In Progress   Comment:    Barb Pena - 01 Jun 2016 1:46 PM     TASK CREATED  Caller: Self; Renew Medication; [phone] (Home); [phone] (Work)  prev task on this were sent to wrong pharm, pt would like this rx for a higher dose ( 05) which was approved by K87, to/MUST go through [address] (90 DAY prescription), when this has been completed PLS CALL pt to inform @ [address] [place] [address] - 01 Jun 2016 1:47 PM     TASK IN [address] Anthony Driver,[facility]  [address] - 01 Jun 2016 1:56 PM     TASK EDITED  Rx to [facility] order  Pt aware  If pt does not get rx in time - I can call in 1 mo to [facility] (not mail in)        Active Problems    1  Acute pharyngitis (462) (J02 9)   2  Anxiety (300 00) (F41 9)   3  Arm paresthesia, right (782 0) (R20 2)   4  Cervical high risk human papillomavirus (HPV) DNA test positive (795 05) (R87 810)   5  Constipation (564 00) (K59 00)   6  Depression (311) (F32 9)   7  Dyspepsia (536 8) (K30)   8  Encounter for routine gynecological examination with Papanicolaou smear of cervix   (V72 31,V76 2) (Z01 419)   9  Encounter for screening colonoscopy (V76 51) (Z12 11)   10  Encounter for screening mammogram for malignant neoplasm of breast (V76 12)    (Z12 31)   11  Exposure to STD (V01 6) (Z20 2)   12  Fatigue (780 79) (R53 83)   13  Leiomyoma of uterus (218 9) (D25 9)   14  Low grade squamous intraepithelial lesion (LGSIL) on Papanicolaou smear of cervix    (795 03) (R87 612)   15  Migraine headache (346 90) (G43 909)   16  Primary hypothyroidism (244 9) (E03 9)   17  Screening for human papillomavirus (HPV) (V73 81) (Z11 51)   18  Symptomatic menopausal or female climacteric states (627 2) (N95 1)   19  Uterine fibroid (218 9) (D25 9)   20   Vaginal discharge (623 5) (N89 8) 21  Viral gastroenteritis (008 8) (A08 4)   22  Vulvovaginitis candida albicans (112 1) (B37 3)    Current Meds   1  Black Cohosh CAPS; Therapy: (Recorded:12Oct2015) to Recorded   2  Butalbital-APAP-Caffeine -40 MG Oral Capsule; Take one capsule every 4 to 6   hours prn  Requested for: 68Ylr6777; Last Rx:99Trk1199 Ordered   3  Fluticasone Propionate 50 MCG/ACT Nasal Suspension; USE 2 SPRAYS IN EACH   NOSTRIL DAILY; Therapy: 81YWV5457 to (Last [facility])  Requested for: 50Zzj7720 Ordered   4  KlonoPIN 0 5 MG Oral Tablet (ClonazePAM); take 4 tablets at bedtime; Therapy: (Recorded:12Oct2015) to Recorded   5  Minivelle 0 05 MG/24HR Transdermal Patch Twice Weekly; APPLY 1 PATCH TWICE   WEEKLY AS DIRECTED; Therapy: 12Sep2015 to (Evaluate:15May2017)  Requested for: 82AVY8171; Last   Rx:20May2016 Ordered   6  Mupirocin Calcium 2 % External Cream (Bactroban); APPLY AND GENTLY MASSAGE   INTO AFFECTED AREA(S) TWICE DAILY  Requested for: 47Hvi3472; Last   Rx:47Krk3748 Ordered   7  Norethindrone Acetate 5 MG Oral Tablet; one half tablet at bedtime; Therapy: 81Tej7787 to (Velasquez Sawyer)  Requested for: 24JEC3652; Last   Rx:95Hpv2364 Ordered   8  Omeprazole 20 MG Oral Capsule Delayed Release; TAKE 1 CAPSULE DAILY; Therapy: 78WJZ3125 to (Velasquez Sawyer)  Requested for: 36MGX5173; Last   Rx:83Tmz1862 Ordered   9  Ondansetron 8 MG Oral Tablet Dispersible; TAKE 1 TABLET Every 8 hours PRN nausea; Therapy: 01GWG2529 to (Last Rx:24Mar2015); Status: ACTIVE - Renewal Voided Ordered   10  Synthroid 112 MCG Oral Tablet (Levothyroxine Sodium); Take 1 tablet daily; Therapy: 24ZTV9016 to (Evaluate:09Jan2017)  Requested for: 05HMM5504; Last    Rx:15Jan2016 Ordered   11  TraMADol HCl - 50 MG Oral Tablet; TAKE 1 TABLET EVERY 6 HOURS AS NEEDED FOR    PAIN;    Therapy: 66JHK5346 to (Evaluate:24Mar2015); Last Rx:16Mar2015 Ordered   12  Vyvanse 50 MG Oral Capsule; take 1/2 tablet twice daily;     Therapy: (Recorded:12Oct2015) to Recorded    Allergies    1  No Known Drug Allergies    Plan  Symptomatic menopausal or female climacteric states    · Minivelle 0 05 MG/24HR Transdermal Patch Twice Weekly; APPLY 1 PATCH  TWICE WEEKLY AS DIRECTED    Signatures   Electronically signed by :  Opal Rojas, ; Jun 1 2016  1:56PM EST                       (Author)

## 2018-01-12 NOTE — MISCELLANEOUS
Message   Recorded as Task   Date: 05/20/2016 12:53 PM, Created By: Kallie Henry   Task Name: Follow Up   Assigned To: Carol Ram   Regarding Patient: Velia Kimball, Status: In Progress   CommentReyes Bowens - 20 May 2016 12:53 PM     TASK CREATED  Caller: Self; [phone] (Home); [phone] (Work)  pt on minvalle patches,0 375 still getting night swets wants 90 day supply mail order can she get a higher dose? pt having a hysterectomy june 13, [phone]   Jabier Poole - 20 May 2016 1:40 PM     TASK REASSIGNED: Previously Assigned To [employer] - 20 May 2016 2:18 PM     TASK REASSIGNED: Previously Assigned To Alem Rummer - 20 May 2016 2:24 PM     TASK IN [address] [place] [address] - 20 May 2016 2:30 PM     TASK EDITED  pt states 2 weeks ago she was having hotmultiple hot flashes, more frequently  felt one coming this morning  wanted to know if she can get higher dose of min[place] patch  is scheduled for hysterectomy in a month  Robbie Garzon - 20 May 2016 3:36 PM     TASK REPLIED TO: Previously Assigned To Robbie Garzon  inform pt [address] sent for higher dose wilfrido Eloy - 20 May 2016 3:52 PM     TASK EDITED  lm pt aware  Active Problems    1  Acute pharyngitis (462) (J02 9)   2  Anxiety (300 00) (F41 9)   3  Arm paresthesia, right (782 0) (R20 2)   4  Cervical high risk human papillomavirus (HPV) DNA test positive (795 05) (R87 810)   5  Constipation (564 00) (K59 00)   6  Depression (311) (F32 9)   7  Dyspepsia (536 8) (K30)   8  Encounter for routine gynecological examination with Papanicolaou smear of cervix   (V72 31,V76 2) (Z01 419)   9  Encounter for screening colonoscopy (V76 51) (Z12 11)   10  Encounter for screening mammogram for malignant neoplasm of breast (V76 12)    (Z12 31)   11  Exposure to STD (V01 6) (Z20 2)   12  Fatigue (780 79) (R53 83)   13  Leiomyoma of uterus (218 9) (D25 9)   14   Low grade squamous intraepithelial lesion (LGSIL) on Papanicolaou smear of cervix    (795 03) (R87 612)   15  Migraine headache (346 90) (G43 909)   16  Primary hypothyroidism (244 9) (E03 9)   17  Screening for human papillomavirus (HPV) (V73 81) (Z11 51)   18  Symptomatic menopausal or female climacteric states (627 2) (N95 1)   19  Uterine fibroid (218 9) (D25 9)   20  Vaginal discharge (623 5) (N89 8)   21  Viral gastroenteritis (008 8) (A08 4)   22  Vulvovaginitis candida albicans (112 1) (B37 3)    Current Meds   1  Black Cohosh CAPS; Therapy: (Recorded:12Oct2015) to Recorded   2  Butalbital-APAP-Caffeine -40 MG Oral Capsule; Take one capsule every 4 to 6   hours prn  Requested for: 54Zer3937; Last Rx:59Qat0986 Ordered   3  Fluticasone Propionate 50 MCG/ACT Nasal Suspension; USE 2 SPRAYS IN EACH   NOSTRIL DAILY; Therapy: 55DEW5158 to (Last Patti Moran)  Requested for: 65Ozs5791 Ordered   4  KlonoPIN 0 5 MG Oral Tablet (ClonazePAM); take 4 tablets at bedtime; Therapy: (Recorded:12Oct2015) to Recorded   5  Minivelle 0 05 MG/24HR Transdermal Patch Twice Weekly; APPLY 1 PATCH TWICE   WEEKLY AS DIRECTED; Therapy: 12Sep2015 to (Evaluate:69Znf9084)  Requested for: 74FMR3168; Last   Rx:89Pup2347 Ordered   6  Mupirocin Calcium 2 % External Cream (Bactroban); APPLY AND GENTLY MASSAGE   INTO AFFECTED AREA(S) TWICE DAILY  Requested for: 89Nzv3989; Last   Rx:25Beu7405 Ordered   7  Norethindrone Acetate 5 MG Oral Tablet; one half tablet at bedtime; Therapy: 22Nvg0863 to (Alfornia Buttery)  Requested for: 66SWY8332; Last   Rx:05Nmr6996 Ordered   8  Omeprazole 20 MG Oral Capsule Delayed Release; TAKE 1 CAPSULE DAILY; Therapy: 10VYH8885 to (Alfornia Buttery)  Requested for: 12GJI7090; Last   Rx:01Jhw8426 Ordered   9  Ondansetron 8 MG Oral Tablet Dispersible; TAKE 1 TABLET Every 8 hours PRN nausea; Therapy: 02HRM8657 to (Last Rx:24Mar2015); Status: ACTIVE - Renewal Voided Ordered   10   Synthroid 112 MCG Oral Tablet (Levothyroxine Sodium); Take 1 tablet daily; Therapy: 81REP7958 to (Evaluate:09Jan2017)  Requested for: 17PUT2042; Last    Rx:15Jan2016 Ordered   11  TraMADol HCl - 50 MG Oral Tablet; TAKE 1 TABLET EVERY 6 HOURS AS NEEDED FOR    PAIN;    Therapy: 30IEU3999 to (Evaluate:24Mar2015); Last Rx:16Mar2015 Ordered   12  Vyvanse 50 MG Oral Capsule; take 1/2 tablet twice daily; Therapy: (Recorded:12Oct2015) to Recorded    Allergies    1   No Known Drug Allergies    Signatures   Electronically signed by : Carlos Freedman LPN; May 20 1042  7:91RJ EST                       (Author)

## 2018-01-13 NOTE — MISCELLANEOUS
Message   Recorded as Task   Date: 06/07/2016 10:38 AM, Created By: Niranjan Dewitt   Task Name: Med Renewal Request   Assigned To: Niranjan Dewitt   Regarding Patient: Tristan Cameron, Status: Active   CommentJanett Carter - 07 Jun 2016 10:38 AM     TASK CREATED  Caller: Self; [phone] (Home); [phone] (Work)  pt [facility]        Active Problems    1  Acute pharyngitis (462) (J02 9)   2  Anxiety (300 00) (F41 9)   3  Arm paresthesia, right (782 0) (R20 2)   4  Cervical high risk human papillomavirus (HPV) DNA test positive (795 05) (R87 810)   5  Constipation (564 00) (K59 00)   6  Depression (311) (F32 9)   7  Dyspepsia (536 8) (K30)   8  Encounter for routine gynecological examination with Papanicolaou smear of cervix   (V72 31,V76 2) (Z01 419)   9  Encounter for screening colonoscopy (V76 51) (Z12 11)   10  Encounter for screening mammogram for malignant neoplasm of breast (V76 12)    (Z12 31)   11  Exposure to STD (V01 6) (Z20 2)   12  Fatigue (780 79) (R53 83)   13  Leiomyoma of uterus (218 9) (D25 9)   14  Low grade squamous intraepithelial lesion (LGSIL) on Papanicolaou smear of cervix    (795 03) (R87 612)   15  Migraine headache (346 90) (G43 909)   16  Primary hypothyroidism (244 9) (E03 9)   17  Screening for human papillomavirus (HPV) (V73 81) (Z11 51)   18  Symptomatic menopausal or female climacteric states (627 2) (N95 1)   19  Uterine fibroid (218 9) (D25 9)   20  Vaginal discharge (623 5) (N89 8)   21  Viral gastroenteritis (008 8) (A08 4)   22  Vulvovaginitis candida albicans (112 1) (B37 3)    Current Meds   1  Black Cohosh CAPS; Therapy: (Recorded:12Oct2015) to Recorded   2  Butalbital-APAP-Caffeine -40 MG Oral Capsule; Take one capsule every 4 to 6   hours prn  Requested for: 92Tlt1700; Last Rx:09Myw3618 Ordered   3  Fluticasone Propionate 50 MCG/ACT Nasal Suspension; USE 2 SPRAYS IN EACH   NOSTRIL DAILY;    Therapy: 72MPI0241 to (Last Rx:21Ccd1963)  Requested for: 78Jwk8328 Ordered   4  KlonoPIN 0 5 MG Oral Tablet (ClonazePAM); take 4 tablets at bedtime; Therapy: (Recorded:12Oct2015) to Recorded   5  Minivelle 0 05 MG/24HR Transdermal Patch Twice Weekly; APPLY 1 PATCH TWICE   WEEKLY AS DIRECTED; Therapy: 12Sep2015 to ([employer])  Requested for: 57BXZ4266; Last   Rx:01Jun2016 Ordered   6  Mupirocin Calcium 2 % External Cream (Bactroban); APPLY AND GENTLY MASSAGE   INTO AFFECTED AREA(S) TWICE DAILY  Requested for: 16Apr2015; Last   Rx:16Apr2015 Ordered   7  Norethindrone Acetate 5 MG Oral Tablet; one half tablet at bedtime; Therapy: 12Sep2015 to (Urvashi Menchaca)  Requested for: 82JYX0524; Last   Rx:05Oct2015 Ordered   8  Omeprazole 20 MG Oral Capsule Delayed Release; TAKE 1 CAPSULE DAILY; Therapy: 05ELM6982 to (Urvashi Menchaca)  Requested for: 46VYU7910; Last   Rx:16Trt2690 Ordered   9  Ondansetron 8 MG Oral Tablet Dispersible; TAKE 1 TABLET Every 8 hours PRN nausea; Therapy: 02PXA9681 to (Last Rx:24Mar2015); Status: ACTIVE - Renewal Voided Ordered   10  Synthroid 112 MCG Oral Tablet (Levothyroxine Sodium); Take 1 tablet daily; Therapy: 10JEU4056 to (Evaluate:09Jan2017)  Requested for: 50LRB6050; Last    Rx:15Jan2016 Ordered   11  TraMADol HCl - 50 MG Oral Tablet; TAKE 1 TABLET EVERY 6 HOURS AS NEEDED FOR    PAIN;    Therapy: 29TNV2690 to (Evaluate:24Mar2015); Last Rx:16Mar2015 Ordered   12  Vyvanse 50 MG Oral Capsule; take 1/2 tablet twice daily; Therapy: (Recorded:12Oct2015) to Recorded    Allergies    1   No Known Drug Allergies    Plan  Symptomatic menopausal or female climacteric states    · Norethindrone Acetate 5 MG Oral Tablet; one half tablet at bedtime    Signatures   Electronically signed by : Mattie Saeed, ; Jun 7 2016 10:39AM EST                       (Author)

## 2018-01-13 NOTE — MISCELLANEOUS
Message   Recorded as Task   Date: 11/29/2016 01:19 PM, Created By: Vivian Rice   Task Name: Hospital Call   Assigned To: Paras Wu   Regarding Patient: Earl Cornelius, Status: In Progress   Comment:    Emely Pang - 29 Nov 2016 1:19 PM     TASK CREATED  Caller: Shakeel Mejia, Pharmacist; Hospital Call  the pharmacy cannot get the minivelle patch can Dr Carolyn James sub estradiol 0/05 patch -pharmacist from [facility]  Mara Espinosa - 29 Nov 2016 1:24 PM     TASK IN PROGRESS   Mara Espinosa - 29 Nov 2016 1:26 PM     TASK EDITED  any objection to changing to generic?  to rk        Active Problems    1  Acute pharyngitis (462) (J02 9)   2  Anxiety (300 00) (F41 9)   3  Arm paresthesia, right (782 0) (R20 2)   4  Cervical high risk human papillomavirus (HPV) DNA test positive (795 05) (R87 810)   5  Constipation (564 00) (K59 00)   6  Contact dermatitis (692 9) (L25 9)   7  Depression (311) (F32 9)   8  Dyspepsia (536 8) (K30)   9  Dysuria (788 1) (R30 0)   10  Encounter for routine gynecological examination with Papanicolaou smear of cervix    (V72 31,V76 2) (Z01 419)   11  Encounter for screening colonoscopy (V76 51) (Z12 11)   12  Encounter for screening mammogram for malignant neoplasm of breast (V76 12)    (Z12 31)   13  Exposure to STD (V01 6) (Z20 2)   14  Fatigue (780 79) (R53 83)   15  Fibroid, uterine (218 9) (D25 9)   16  Gastroenteritis (558 9) (K52 9)   17  Low grade squamous intraepithelial lesion (LGSIL) on Papanicolaou smear of cervix    (795 03) (R87 612)   18  Migraine headache (346 90) (G43 909)   19  Primary hypothyroidism (244 9) (E03 9)   20  Rash (782 1) (R21)   21  Screening for human papillomavirus (HPV) (V73 81) (Z11 51)   22  Symptomatic menopausal or female climacteric states (627 2) (N95 1)   23  Uterine fibroid (218 9) (D25 9)   24  UTI (urinary tract infection) (599 0) (N39 0)   25  Vaginal discharge (623 5) (N89 8)   26  Viral gastroenteritis (008 8) (A08 4)   27   Vulvovaginitis candida albicans (112 1) (B37 3)    Current Meds   1  Butalbital-APAP-Caffeine -40 MG Oral Capsule; Take one capsule every 4 to 6   hours prn  Requested for: 02Dzv1012; Last Rx:26Gzq9438 Ordered   2  Fluticasone Propionate 50 MCG/ACT Nasal Suspension; USE 2 SPRAYS IN EACH   NOSTRIL DAILY; Therapy: 18BCA4932 to (Last Rx:19Tif2404)  Requested for: 42Ktn8456 Ordered   3  KlonoPIN 0 5 MG Oral Tablet (ClonazePAM); take 4 tablets at bedtime; Therapy: (Recorded:12Oct2015) to Recorded   4  Minivelle 0 05 MG/24HR Transdermal Patch Twice Weekly; APPLY 1 PATCH TWICE   WEEKLY AS DIRECTED; Therapy: 64Dka4824 to (Karla Cortes)  Requested for: 23HWS1294; Last   Rx:01Jun2016 Ordered   5  Mupirocin Calcium 2 % External Cream (Bactroban); APPLY AND GENTLY MASSAGE   INTO AFFECTED AREA(S) TWICE DAILY  Requested for: 20Yfr3427; Last   Rx:12Plh8250 Ordered   6  Ondansetron 8 MG Oral Tablet Dispersible; TAKE 1 TABLET Every 8 hours PRN nausea; Therapy: 43OPT1317 to (Last Rx:74Rpx6883) Ordered   7  PredniSONE 10 MG Oral Tablet; 5 tabs for 2 days, 4 tabs for two days, three tabs for two   days, two tabs for two days, one tab for two days; Therapy: 37Ric8412 to (Last Rx:19Xaq8503)  Requested for: 80Vkh8038 Ordered   8  Synthroid 112 MCG Oral Tablet (Levothyroxine Sodium); Take 1 tablet daily; Therapy: 91JBD3885 to (Evaluate:09Jan2017)  Requested for: 42PUW8417; Last   Rx:15Jan2016 Ordered   9  TraMADol HCl - 50 MG Oral Tablet; TAKE 1 TABLET EVERY 6 HOURS AS NEEDED FOR   PAIN;   Therapy: 20NXD1164 to (Evaluate:02Nov2016); Last Rx:74Eyy3279 Ordered   10  Triamcinolone Acetonide 0 5 % External Cream; APPLY TO THE AFFECTED AREAS 3    TIMES DAILY AS NEEDED; Therapy: 17CDR9558 to (Last Rx:90Xpk6661)  Requested for: 09Mfn1698 Ordered   11  Vyvanse 70 MG Oral Capsule Recorded    Allergies    1   No Known Drug Allergies    Signatures   Electronically signed by : Pramod Slade, ; Nov 29 2016  1:26PM EST (Author)

## 2018-01-14 VITALS
DIASTOLIC BLOOD PRESSURE: 62 MMHG | TEMPERATURE: 97.5 F | HEIGHT: 63 IN | WEIGHT: 130 LBS | SYSTOLIC BLOOD PRESSURE: 118 MMHG | BODY MASS INDEX: 23.04 KG/M2

## 2018-01-14 NOTE — MISCELLANEOUS
Message  Return to work or school:   Leeanne Salgado is under my professional care  She was seen in my office on 01/25/2016       Please excuse 1/23/2016          Signatures   Electronically signed by : Idania Figueroa DO; Jan 25 2016 10:32PM EST

## 2018-01-15 NOTE — MISCELLANEOUS
Message   Recorded as Task   Date: 03/22/2017 11:21 AM, Created By: Cirilo Quiroga   Task Name: Med Renewal Request   Assigned To: Service Management [employer] File   Regarding Patient: Ines Posadas, Status: In Progress   Comment:    Cirilo Quiroga - 22 Mar 2017 11:21 AM     TASK CREATED  Caller: Self; Renew Medication; [phone] (Home)  spoke with pt - the patch that she's currently on is NOT working - the pharmacy changed the brand - she can't sleep - has terrible night sweats - she would like it changed ASAP - please advise  [address] [place] [address] - 22 Mar 2017 11:52 AM     TASK IN PROGRESS   Ezio Medrano - 22 Mar 2017 12:07 PM     TASK EDITED  Pt on generic estradiol patch, 0 05 from [facility] and it does not work  Pt feels it is that pharmacies generic brand that does not work  Pt wants generic estradiol called to [address]  I did call rx in  If it does not work, it might just be that pts hot flashes have just increased but to her menopausal sx  Pt will try rx from [facility] and cb with update        Active Problems    1  Anxiety (300 00) (F41 9)   2  Arm paresthesia, right (782 0) (R20 2)   3  Cervical high risk human papillomavirus (HPV) DNA test positive (795 05) (R87 810)   4  Constipation (564 00) (K59 00)   5  Contact dermatitis (692 9) (L25 9)   6  Depression (311) (F32 9)   7  Dyspepsia (536 8) (K30)   8  Dysuria (788 1) (R30 0)   9  Encounter for routine gynecological examination with Papanicolaou smear of cervix   (V72 31,V76 2) (Z01 419)   10  Encounter for screening colonoscopy (V76 51) (Z12 11)   11  Encounter for screening mammogram for malignant neoplasm of breast (V76 12)    (Z12 31)   12  Exposure to STD (V01 6) (Z20 2)   13  Fibroid, uterine (218 9) (D25 9)   14  Primary hypothyroidism (244 9) (E03 9)   15  Rash (782 1) (R21)   16  Symptomatic menopausal or female climacteric states (627 2) (N95 1)   17  Uterine fibroid (218 9) (D25 9)   18   Vulvovaginitis candida albicans (112 1) (B37 3)    Current Meds   1  Butalbital-APAP-Caffeine -40 MG Oral Capsule; Take one capsule every 4 to 6   hours prn  Requested for: 10Cwy1338; Last Rx:14Jyr0799 Ordered   2  Estradiol 0 05 MG/24HR Transdermal Patch Twice Weekly; APPLY 1 PATCH TWICE   WEEKLY AS DIRECTED; Therapy: 50ACV5001 to (Evaluate:91Wao3469)  Requested for: 64WQG5414; Last   Rx:98Gbv7965 Ordered   3  Fluticasone Propionate 50 MCG/ACT Nasal Suspension; USE 2 SPRAYS IN EACH   NOSTRIL DAILY; Therapy: 44CHI4323 to (Last Deshawn Thao)  Requested for: 51Avi2484 Ordered   4  KlonoPIN 0 5 MG Oral Tablet (ClonazePAM); take 4 tablets at bedtime; Therapy: (Recorded:12Oct2015) to Recorded   5  Minivelle 0 05 MG/24HR Transdermal Patch Twice Weekly; APPLY 1 PATCH TWICE   WEEKLY AS DIRECTED; Therapy: 64Ecp4345 to (Toney Felix)  Requested for: 71UPV1374; Last   Rx:01Jun2016 Ordered   6  Mupirocin Calcium 2 % External Cream (Bactroban); APPLY AND GENTLY MASSAGE   INTO AFFECTED AREA(S) TWICE DAILY  Requested for: 05Jtn8516; Last   Rx:31Bcr8681 Ordered   7  Ondansetron 8 MG Oral Tablet Dispersible; TAKE 1 TABLET Every 8 hours PRN nausea; Therapy: 61ATF8388 to (Last Rx:29Jun2016) Ordered   8  PredniSONE 10 MG Oral Tablet; 5 tabs for 2 days, 4 tabs for two days, three tabs for two   days, two tabs for two days, one tab for two days; Therapy: 51Msz3570 to (Last Rx:84Fut8260)  Requested for: 25Flf1290 Ordered   9  Synthroid 112 MCG Oral Tablet (Levothyroxine Sodium); Take 1 tablet daily; Therapy: 26TFE3847 to (Evaluate:05Jan2018)  Requested for: 57OUH9009; Last   Rx:11Jan2017 Ordered   10  TraMADol HCl - 50 MG Oral Tablet; TAKE 1 TABLET EVERY 6 HOURS AS NEEDED FOR    PAIN;    Therapy: 03MCY2168 to (Evaluate:29Mar2017); Last Rx:21Mar2017 Ordered   11  Triamcinolone Acetonide 0 5 % External Cream; APPLY TO THE AFFECTED AREAS 3    TIMES DAILY AS NEEDED; Therapy: 99CFZ6736 to (Last Rx:43Hnh8267)  Requested for: 23Yeb6202 Ordered   12  Vyvanse 70 MG Oral Capsule Recorded    Allergies    1  No Known Drug Allergies    Signatures   Electronically signed by :  Danelle Rojas, ; Mar 22 2017 12:07PM EST                       (Author)

## 2018-01-15 NOTE — PROGRESS NOTES
Assessment    1  Viral gastroenteritis (008 8) (A08 4)   2  Arm paresthesia, right (782 0) (R20 2)    Discussion/Summary    Consider orthopedic evaluation for right arm paresthesia  Card given for [facility]  Consider physical therapy  Chief Complaint  "stomach virus" times 3 days, numbness R arm in am at times      History of Present Illness  HPI: Patient had vomiting and diarrhea over the last several days  He states his stomach virus went to the house  She's starting to feel better but is still slightly dehydrated  She is trying to drink increased amounts of water and juice today to rehydrate  She states she needs a note to return to work  She works as a nurse  She also notes some paresthesias symptoms to the right arm upon awakening over the last one to 2 months  This only occurs in the morning  Denies loss of strength to the arm  She states numbness occurs to the entire arm and resolves minutes after awakening  Review of Systems    Constitutional: No fever, no chills, feels well, no tiredness, no recent weight gain or loss  ENT: no ear ache, no loss of hearing, no nosebleeds or nasal discharge, no sore throat or hoarseness  Cardiovascular: no complaints of slow or fast heart rate, no chest pain, no palpitations, no leg claudication or lower extremity edema  Respiratory: no complaints of shortness of breath, no wheezing, no dyspnea on exertion, no orthopnea or PND  Breasts: no complaints of breast pain, breast lump or nipple discharge  Gastrointestinal: no complaints of abdominal pain, no constipation, no nausea or diarrhea, no vomiting, no bloody stools  Genitourinary: no complaints of dysuria, no incontinence, no pelvic pain, no dysmenorrhea, no vaginal discharge or abnormal vaginal bleeding  Musculoskeletal: as noted in HPI  Integumentary: no complaints of skin rash or lesion, no itching or dry skin, no skin wounds     Neurological: no complaints of headache, no confusion, no numbness or tingling, no dizziness or fainting  Active Problems    1  Acute pharyngitis (462) (J02 9)   2  Anxiety (300 00) (F41 9)   3  Cervical high risk human papillomavirus (HPV) DNA test positive (795 05) (R87 810)   4  Constipation (564 00) (K59 00)   5  Depression (311) (F32 9)   6  Dyspepsia (536 8) (K30)   7  Encounter for routine gynecological examination with Papanicolaou smear of cervix   (V72 31,V76 2) (Z01 419,Z12 4)   8  Encounter for screening colonoscopy (V76 51) (Z12 11)   9  Encounter for screening mammogram for malignant neoplasm of breast (V76 12)   (Z12 31)   10  Exposure to STD (V01 6) (Z20 2)   11  Fatigue (780 79) (R53 83)   12  Leiomyoma of uterus (218 9) (D25 9)   13  Low grade squamous intraepithelial lesion (LGSIL) on Papanicolaou smear of cervix    (795 03) (R87 612)   14  Migraine headache (346 90) (G43 909)   15  Primary hypothyroidism (244 9) (E03 9)   16  Screening for human papillomavirus (HPV) (V73 81) (Z11 51)   17  Symptomatic menopausal or female climacteric states (627 2) (N95 1)   18  Uterine fibroid (218 9) (D25 9)   19  Vaginal discharge (623 5) (N89 8)   20  Vulvovaginitis candida albicans (112 1) (B37 3)    Past Medical History    1  History of Acute upper respiratory infection (465 9) (J06 9)   2  History of Anxiety (300 00) (F41 9)   3  History of Clear Vaginal Discharge (623 5)   4  History of Depression (311) (F32 9)   5  History of Encounter for routine gynecological examination (V72 31) (Z01 419)   6  History of Fibromyalgia (729 1) (M79 7)   7  History of abdominal pain (V13 89) (Z87 898)   8  History of acute sinusitis (V12 69) (Z87 09)   9  History of allergic rhinitis (V12 69) (Z87 09)   10  History of dehydration (V12 29) (Z86 39)   11  History of gastroenteritis (V12 79) (Z87 19)   12  History of hypothyroidism (V12 29) (Z86 39)   13  History of sleep disturbance (V13 89) (Z87 898)   14  History of thyroid disease (V12 29) (Z86 39)   15  History of viral infection (V12 09) (Z86 19)   16  History of Lightheadedness (780 4) (R42)   17  History of Urinary Tract Infection (V13 02)  Active Problems And Past Medical History Reviewed: The active problems and past medical history were reviewed and updated today  Family History    1  No pertinent family history    2  Family history of Anxiety (Symptom)   3  Family history of Depression   4  Family history of Heart Disease (V17 49)   5  Family history of Hypertension (V17 49)   6  Family history of Respiratory Disorder   7  Family history of Sleep Disturbances   8  Family history of Varicose Veins Of Lower Extremities    Social History    · Being A Social Drinker   · Caffeine Use   · Former smoker (O29 63) (N63 760)    Surgical History    1  History of Left Breast Biopsy During Breast Surgery   2  History of Nasal Septal Deviation Repair   3  History of Tubal Ligation    Current Meds   1  Black Cohosh CAPS; Therapy: (Recorded:42Sev3800) to Recorded   2  Butalbital-APAP-Caffeine -40 MG Oral Capsule; Take one capsule every 4 to 6   hours prn  Requested for: 92LWB7016; Last Rx:72Hfp6256 Ordered   3  Fluticasone Propionate 50 MCG/ACT Nasal Suspension; USE 2 SPRAYS IN EACH   NOSTRIL DAILY; Therapy: 80ZHH4224 to (Edmundo Champion)  Requested for: 58Aph5701 Ordered   4  KlonoPIN 0 5 MG Oral Tablet; take 4 tablets at bedtime; Therapy: (Recorded:98Rdf8098) to Recorded   5  Minivelle 0 0375 MG/24HR Transdermal Patch Twice Weekly; APPLY 1 PATCH TWICE   WEEKLY AS DIRECTED; Therapy: 05Xco8958 to (Evaluate:85Rmo8498)  Requested for: 60Qtu4791; Last   Rx:17Cwx3571 Ordered   6  Mupirocin Calcium 2 % External Cream; APPLY AND GENTLY MASSAGE INTO   AFFECTED AREA(S) TWICE DAILY  Requested for: 80Rhn9510; Last Rx:00Bmx2814   Ordered   7  Norethindrone Acetate 5 MG Oral Tablet; one half tablet at bedtime; Therapy: 08Yes7099 to (Leti Castro)  Requested for: 42JVR6298; Last   Rx:27Grg6661 Ordered   8  Omeprazole 20 MG Oral Capsule Delayed Release; TAKE 1 CAPSULE DAILY; Therapy: 13GHJ8024 to (Huan Ayala)  Requested for: 19QIF4766; Last   Rx:26Yed6442 Ordered   9  Ondansetron 8 MG Oral Tablet Dispersible; TAKE 1 TABLET Every 8 hours PRN nausea; Therapy: 92TCY7429 to (Last Rx:24Mar2015) Ordered   10  Synthroid 112 MCG Oral Tablet; Take 1 tablet daily; Therapy: 77LVO3161 to (Evaluate:09Jan2017)  Requested for: 59DJY5354; Last    Rx:15Jan2016 Ordered   11  TraMADol HCl - 50 MG Oral Tablet; TAKE 1 TABLET EVERY 6 HOURS AS NEEDED FOR    PAIN;    Therapy: 83OAA2974 to (Evaluate:24Mar2015); Last Rx:16Mar2015 Ordered   12  Vyvanse 50 MG Oral Capsule; take 1/2 tablet twice daily; Therapy: (Recorded:12Oct2015) to Recorded    The medication list was reviewed and updated today  Allergies    1  No Known Drug Allergies    Vitals   Recorded: 35ZPO3971 02:10PM   Temperature 54 7 F   Systolic 296   Diastolic 80   Height 5 ft 3 in   Weight 140 lb    BMI Calculated 24 8   BSA Calculated 1 66     Physical Exam    Constitutional   General appearance: No acute distress, well appearing and well nourished  Eyes   Conjunctiva and lids: No swelling, erythema or discharge  Pupils and irises: Equal, round and reactive to light  Ears, Nose, Mouth, and Throat   External inspection of ears and nose: Normal     Otoscopic examination: Tympanic membranes translucent with normal light reflex  Canals patent without erythema  Nasal mucosa, septum, and turbinates: Normal without edema or erythema  Oropharynx: Normal with no erythema, edema, exudate or lesions  Pulmonary   Respiratory effort: No increased work of breathing or signs of respiratory distress  Auscultation of lungs: Clear to auscultation  Cardiovascular   Palpation of heart: Normal PMI, no thrills  Auscultation of heart: Normal rate and rhythm, normal S1 and S2, without murmurs      Examination of extremities for edema and/or varicosities: Normal     Carotid pulses: Normal     Abdomen   Abdomen: Non-tender, no masses  Liver and spleen: No hepatomegaly or splenomegaly  Lymphatic   Palpation of lymph nodes in neck: No lymphadenopathy  Musculoskeletal   Gait and station: Normal     Digits and nails: Normal without clubbing or cyanosis  Inspection/palpation of joints, bones, and muscles: Normal     Skin   Skin and subcutaneous tissue: Normal without rashes or lesions  Neurologic   Cranial nerves: Cranial nerves 2-12 intact  Reflexes: 2+ and symmetric  Sensation: No sensory loss      Psychiatric   Orientation to person, place, and time: Normal     Mood and affect: Normal          Future Appointments    Date/Time Provider Specialty Site   10/12/2016 11:00 AM Judie Whitaker DO Obstetrics/Gynecology [facility]     Signatures   Electronically signed by : Idania Figueroa DO; Jan 25 2016  3:06PM EST                       (Author)

## 2018-01-15 NOTE — RESULT NOTES
Message   Recorded as Task   Date: 08/22/2016 10:15 AM, Created By: Jeromy Vigil   Task Name: Follow Up   Assigned To: Lenore Browning   Regarding Patient: Walker Hartman, Status: In Progress   Diannbenny Mireille - 22 Aug 2016 10:15 AM     TASK CREATED  Caller: Self; [phone] (Home); [phone] (Work)  pt in horrific pain and having 24 hr swets, was told to put setrogen patch back on last night but is still having a problem please call her,rk did surgery last monday, call her at [address] [place] [address] - 22 Aug 2016 10:23 AM     TASK IN PROGRESS   Alisia Tony - 22 Aug 2016 10:48 AM     TASK EDITED   Can you please give pt po appt for next Mon  I found an appt but it was a pn  You can lm on her phone if she does not answer  She comes to [place]  office  Pt having multitude of problems  No bm for 1 week  She took multiple drugs and finally drank citrate of magnesia  her bowels are moving  ! Pt not taking anything for pain but advil - to take q 4 hrs with food  Hot flashes were extreme so pt put back minivelle patch yesterday  She is afraid of blood clots as her brother had stroke recently  Pt is ambulating so I feel this is ok - I did ask RJS - he also said ok to use patch        Signatures   Electronically signed by :  Carmelita Rojas, ; Aug 22 2016 10:49AM EST                       (Author)

## 2018-01-16 NOTE — MISCELLANEOUS
Message   Recorded as Task   Date: 06/07/2016 10:38 AM, Created By: Earl Crowder   Task Name: Med Renewal Request   Assigned To: Earl Crowder   Regarding Patient: Chayo Restrepo, Status: Active   CommentWilder Rothman - 07 Jun 2016 10:38 AM     TASK CREATED  Caller: Self; [phone] (Home); [phone] (Work)  [facility]        Active Problems    1  Acute pharyngitis (462) (J02 9)   2  Anxiety (300 00) (F41 9)   3  Arm paresthesia, right (782 0) (R20 2)   4  Cervical high risk human papillomavirus (HPV) DNA test positive (795 05) (R87 810)   5  Constipation (564 00) (K59 00)   6  Depression (311) (F32 9)   7  Dyspepsia (536 8) (K30)   8  Encounter for routine gynecological examination with Papanicolaou smear of cervix   (V72 31,V76 2) (Z01 419)   9  Encounter for screening colonoscopy (V76 51) (Z12 11)   10  Encounter for screening mammogram for malignant neoplasm of breast (V76 12)    (Z12 31)   11  Exposure to STD (V01 6) (Z20 2)   12  Fatigue (780 79) (R53 83)   13  Leiomyoma of uterus (218 9) (D25 9)   14  Low grade squamous intraepithelial lesion (LGSIL) on Papanicolaou smear of cervix    (795 03) (R87 612)   15  Migraine headache (346 90) (G43 909)   16  Primary hypothyroidism (244 9) (E03 9)   17  Screening for human papillomavirus (HPV) (V73 81) (Z11 51)   18  Symptomatic menopausal or female climacteric states (627 2) (N95 1)   19  Uterine fibroid (218 9) (D25 9)   20  Vaginal discharge (623 5) (N89 8)   21  Viral gastroenteritis (008 8) (A08 4)   22  Vulvovaginitis candida albicans (112 1) (B37 3)    Current Meds   1  Black Cohosh CAPS; Therapy: (Recorded:12Oct2015) to Recorded   2  Butalbital-APAP-Caffeine -40 MG Oral Capsule; Take one capsule every 4 to 6   hours prn  Requested for: 07Hyi7488; Last Rx:74Ppz4317 Ordered   3  Fluticasone Propionate 50 MCG/ACT Nasal Suspension; USE 2 SPRAYS IN EACH   NOSTRIL DAILY;    Therapy: 54BAN3709 to (Last Rx:73Pzf1195)  Requested for: 50Iep1236 Ordered   4  KlonoPIN 0 5 MG Oral Tablet (ClonazePAM); take 4 tablets at bedtime; Therapy: (Recorded:12Oct2015) to Recorded   5  Minivelle 0 05 MG/24HR Transdermal Patch Twice Weekly; APPLY 1 PATCH TWICE   WEEKLY AS DIRECTED; Therapy: 78Ayn8739 to (Ryan Bermudez)  Requested for: 76VNB4279; Last   Rx:01Jun2016 Ordered   6  Mupirocin Calcium 2 % External Cream (Bactroban); APPLY AND GENTLY MASSAGE   INTO AFFECTED AREA(S) TWICE DAILY  Requested for: 16Apr2015; Last   Rx:48Etm3474 Ordered   7  Omeprazole 20 MG Oral Capsule Delayed Release; TAKE 1 CAPSULE DAILY; Therapy: 28RTE6620 to (Delmer Loza)  Requested for: 40MQZ0597; Last   Rx:37Eyf0038 Ordered   8  Ondansetron 8 MG Oral Tablet Dispersible; TAKE 1 TABLET Every 8 hours PRN nausea; Therapy: 65JSN0259 to (Last Rx:24Mar2015); Status: ACTIVE - Renewal Voided Ordered   9  Synthroid 112 MCG Oral Tablet (Levothyroxine Sodium); Take 1 tablet daily; Therapy: 19BTN3811 to (Evaluate:09Jan2017)  Requested for: 04BSL8683; Last   Rx:15Jan2016 Ordered   10  TraMADol HCl - 50 MG Oral Tablet; TAKE 1 TABLET EVERY 6 HOURS AS NEEDED FOR    PAIN;    Therapy: 72YEN7362 to (Evaluate:24Mar2015); Last Rx:16Mar2015 Ordered   11  Vyvanse 50 MG Oral Capsule; take 1/2 tablet twice daily; Therapy: (Recorded:12Oct2015) to Recorded    Allergies    1   No Known Drug Allergies    Signatures   Electronically signed by : Ellis Bryant, ; Jun 7 2016 10:51AM EST                       (Author)

## 2018-01-16 NOTE — RESULT NOTES
Message   Recorded as Task   Date: 07/25/2016 09:17 AM, Created By: Sultana Calvert   Task Name: Call Back   Assigned To: Jolly Whitfield   Regarding Patient: Bianca Guerrero, Status: In Progress   Comment:    Jovita Negron - 25 Jul 2016 9:17 AM     TASK CREATED  PT CALLED LOOKING FOR A WORK NOTE FOR YESTERDAY, SHE HAD TO LEAVE WORK EARLY  SHE IS COMING IN TODAY TO  HER DISABILITY PAPERS SO SHE WOULD LIKE TO PICK THIS UP TODAY  HER # [phone]   [facility] Ally - 25 Jul 2016 9:55 AM     TASK IN PROGRESS   Izabela Ally - 25 Jul 2016 10:00 AM     TASK EDITED   Pt had such severe pain yesterday that she left work 4 hrs early  Pt needs a note excusing her  OK if I write it  If note not ready this AM - Note to be faxed to her work tomorrow as she is not there today  Fax # for [phone]   Jeff Yasmeen - 25 Jul 2016 12:54 PM     TASK REPLIED TO: Previously Assigned To Robbie Garzon  OK to write note for work   [facility] - 25 Jul 2016 2:03 PM     TASK EDITED   Pt picked up note with other papers  No need to fax        Signatures   Electronically signed by :  Merrick Rojas, ; Jul 25 2016  2:03PM EST                       (Author)

## 2018-01-16 NOTE — MISCELLANEOUS
Message  Reviewed recent pelvic U/S with pt  Pedunculated fibroid increased by >30%  Pt has not has significant change in quality of life  Does still have intermittent menses   this month most recent  Reviewed options:  1  repeat U/S 6 months  2  TLH with contained morcellation via scalpel(vag vs abd techniques)    Risks/benefits of all approaches discussed  Reviewed 1 in 1,000 risk of sarcoma  Pt would like to think about options   to call back one week  Results/Data  Results    * US PELVIS COMPLETE (TRANSABDOMINAL AND TRANSVAGINAL)   US PELVIS COMPLETE (TRANSABDOMINAL AND TRANSVAGINAL): PELVIC  ULTRASOUND, COMPLETE     INDICATION: History of fibroids  Follow-up evaluation  Last menstrual period  of 8/2015               COMPARISON: 5/7/2014     TECHNIQUE:   Transabdominal pelvic ultrasound was performed in sagittal and  transverse planes with a curvilinear transducer  Additional transvaginal imaging was  performed to better evaluate the endometrium and ovaries  Imaging included  volumetric    sweeps as well as traditional still imaging technique  FINDINGS:     UTERUS:   The uterus is anteverted in position, measuring 9 5 x 5 9 x 5 2 cm  A large 14 4 x 8 4 x 14 3 cm broadly pedunculated fundal fibroid redemonstrated,  projecting anteriorly  There is a smaller 2 x 2 x 2 5 cm intramural myoma also  anteriorly     ENDOMETRIUM:     Normal caliber of 4 mm  Homogenous and normal in appearance  OVARIES/ADNEXA:   Right ovary:  2 x 2 4 x 1 7 cm  No suspicious right ovarian abnormality  Doppler flow within normal limits  Left ovary:  1 1 x 1 5 x 1 1 cm  No suspicious left ovarian abnormality  Prominent left adnexal vessels redemonstrated  Doppler flow within normal limits  No suspicious adnexal mass or loculated collections  There is no free fluid  IMPRESSION:         1  Large anterior broadly pedunculated myoma may have increased in size from the  previous study  2   Small intramural myoma redemonstrated  3   Prominent left adnexal vessels may be related to external compression from the  myoma versus gonadal varices indicating underlying pelvic congestion syndrome  Further clinical evaluation recommended  Workstation performed: [id number]     Signed by: Shadia Saravia MD   3/24/16     Signatures   Electronically signed by : Partha Wood DO;  Apr 11 2016  3:00PM EST                       (Author)

## 2018-01-17 NOTE — MISCELLANEOUS
Message   Recorded as Task   Date: 05/08/2017 09:54 AM, Created By: Candie Moreno   Task Name: Med Renewal Request   Assigned To: Tonie Weir   Regarding Patient: Navi Lieberman, Status: Active   CommentRoxane Beat - 08 May 2017 9:54 AM     TASK CREATED  Caller: Self; Renew Medication; [phone] (Home)  pt is requesting we send a 90day RX for Estradiol   5 patch changes 2x week  to [facility]  pt is @ [phone]  Tonie Weir - 08 May 2017 10:13 AM     TASK EDITED  sent to pharm        Active Problems    1  Anxiety (300 00) (F41 9)   2  Arm paresthesia, right (782 0) (R20 2)   3  Cervical high risk human papillomavirus (HPV) DNA test positive (795 05) (R87 810)   4  Constipation (564 00) (K59 00)   5  Contact dermatitis (692 9) (L25 9)   6  Depression (311) (F32 9)   7  Dyspepsia (536 8) (K30)   8  Dysuria (788 1) (R30 0)   9  Encounter for routine gynecological examination with Papanicolaou smear of cervix   (V72 31,V76 2) (Z01 419)   10  Encounter for screening colonoscopy (V76 51) (Z12 11)   11  Encounter for screening mammogram for malignant neoplasm of breast (V76 12)    (Z12 31)   12  Exposure to STD (V01 6) (Z20 2)   13  Fibroid, uterine (218 9) (D25 9)   14  Primary hypothyroidism (244 9) (E03 9)   15  Rash (782 1) (R21)   16  Symptomatic menopausal or female climacteric states (627 2) (N95 1)   17  Uterine fibroid (218 9) (D25 9)   18  Vulvovaginitis candida albicans (112 1) (B37 3)    Current Meds   1  Butalbital-APAP-Caffeine -40 MG Oral Capsule; Take one capsule every 4 to 6   hours prn  Requested for: 56Uyt0643; Last Rx:85Exl8585 Ordered   2  Estradiol 0 05 MG/24HR Transdermal Patch Twice Weekly; APPLY 1 PATCH TWICE   WEEKLY AS DIRECTED; Therapy: 84CYS6392 to (Evaluate:61Okf5896)  Requested for: 10VZV7631; Last   Rx:30Nov2016 Ordered   3  Fluticasone Propionate 50 MCG/ACT Nasal Suspension; USE 2 SPRAYS IN EACH   NOSTRIL DAILY;    Therapy: 94SGR7721 to (Last Rx:21Dec2015) Requested for: 61Jze0048 Ordered   4  KlonoPIN 0 5 MG Oral Tablet (ClonazePAM); take 4 tablets at bedtime; Therapy: (Recorded:73Kaa6875) to Recorded   5  Minivelle 0 05 MG/24HR Transdermal Patch Twice Weekly; APPLY 1 PATCH TWICE   WEEKLY AS DIRECTED; Therapy: 56Dnu8652 to (Jeffrey Pinedo)  Requested for: 42LOI2402; Last   Rx:01Jun2016 Ordered   6  Mupirocin Calcium 2 % External Cream (Bactroban); APPLY AND GENTLY MASSAGE   INTO AFFECTED AREA(S) TWICE DAILY  Requested for: 23Vvq5921; Last   Rx:80Yzp1082 Ordered   7  Ondansetron 8 MG Oral Tablet Dispersible; TAKE 1 TABLET Every 8 hours PRN nausea; Therapy: 29DPE4945 to (Last Rx:29Jun2016) Ordered   8  PredniSONE 10 MG Oral Tablet; 5 tabs for 2 days, 4 tabs for two days, three tabs for two   days, two tabs for two days, one tab for two days; Therapy: 00Ovh8442 to (Last Rx:10Sep2016)  Requested for: 87Hfx8359 Ordered   9  Synthroid 112 MCG Oral Tablet (Levothyroxine Sodium); Take 1 tablet daily; Therapy: 06PST8477 to (Evaluate:05Jan2018)  Requested for: 71TFY3501; Last   Rx:11Jan2017 Ordered   10  TraMADol HCl - 50 MG Oral Tablet; TAKE 1 TABLET EVERY 6 HOURS AS NEEDED FOR    PAIN;    Therapy: 56LPI7407 to (Evaluate:29Mar2017); Last Rx:21Mar2017 Ordered   11  Triamcinolone Acetonide 0 5 % External Cream; APPLY TO THE AFFECTED AREAS 3    TIMES DAILY AS NEEDED; Therapy: 32WEC9692 to (Last Rx:05Ads4138)  Requested for: 03Tlv2464 Ordered   12  Vyvanse 70 MG Oral Capsule Recorded    Allergies    1   No Known Drug Allergies    Plan  Symptomatic menopausal or female climacteric states    · Estradiol 0 05 MG/24HR Transdermal Patch Twice Weekly; APPLY 1 PATCH  TWICE WEEKLY AS DIRECTED    Signatures   Electronically signed by : Tyson De Los Santos, ; May  8 2017 10:13AM EST                       (Author)

## 2018-01-18 NOTE — MISCELLANEOUS
Message   Recorded as Task   Date: 11/29/2016 01:19 PM, Created By: Vanessa Prince   Task Name: Hospital Call   Assigned To: Gato Avila   Regarding Patient: Inderjit Linares, Status: In Progress   Comment:    Emely Pang - 29 Nov 2016 1:19 PM     TASK CREATED  Caller: Partha Chen, Pharmacist; Hospital Call  the pharmacy cannot get the minivelle patch can Dr Andrea Kahn sub estradiol 0/05 patch -pharmacist from [facility]  Mara Espinosa - 29 Nov 2016 1:24 PM     TASK IN PROGRESS   Mara Espinosa - 29 Nov 2016 1:26 PM     TASK EDITED  any objection to changing to generic?  to Mel Micahjackie - 29 Nov 2016 1:26 PM     TASK REASSIGNED: Previously Assigned To Erinn Sinclair - 30 Nov 2016 4:01 PM     TASK REPLIED TO: Previously Assigned To Chaya Dakins  Please inform pt ERX for generic patch sent to [email or web address] - 30 Nov 2016 4:13 PM     TASK EDITED  made pt aware, pt did not want to have generic, prefers name brand, explained there has been backorder on it  pt switching pharmacies will call us back if she wants uis to try the name brand to new pharmacy  Active Problems    1  Acute pharyngitis (462) (J02 9)   2  Anxiety (300 00) (F41 9)   3  Arm paresthesia, right (782 0) (R20 2)   4  Cervical high risk human papillomavirus (HPV) DNA test positive (795 05) (R87 810)   5  Constipation (564 00) (K59 00)   6  Contact dermatitis (692 9) (L25 9)   7  Depression (311) (F32 9)   8  Dyspepsia (536 8) (K30)   9  Dysuria (788 1) (R30 0)   10  Encounter for routine gynecological examination with Papanicolaou smear of cervix    (V72 31,V76 2) (Z01 419)   11  Encounter for screening colonoscopy (V76 51) (Z12 11)   12  Encounter for screening mammogram for malignant neoplasm of breast (V76 12)    (Z12 31)   13  Exposure to STD (V01 6) (Z20 2)   14  Fatigue (780 79) (R53 83)   15  Fibroid, uterine (218 9) (D25 9)   16  Gastroenteritis (558 9) (K52 9)   17   Low grade squamous intraepithelial lesion (LGSIL) on Papanicolaou smear of cervix    (795 03) (R87 612)   18  Migraine headache (346 90) (G43 909)   19  Primary hypothyroidism (244 9) (E03 9)   20  Rash (782 1) (R21)   21  Screening for human papillomavirus (HPV) (V73 81) (Z11 51)   22  Symptomatic menopausal or female climacteric states (627 2) (N95 1)   23  Uterine fibroid (218 9) (D25 9)   24  UTI (urinary tract infection) (599 0) (N39 0)   25  Vaginal discharge (623 5) (N89 8)   26  Viral gastroenteritis (008 8) (A08 4)   27  Vulvovaginitis candida albicans (112 1) (B37 3)    Current Meds   1  Butalbital-APAP-Caffeine -40 MG Oral Capsule; Take one capsule every 4 to 6   hours prn  Requested for: 40Lbw0028; Last Rx:94Ech9810 Ordered   2  Estradiol 0 05 MG/24HR Transdermal Patch Twice Weekly; APPLY 1 PATCH TWICE   WEEKLY AS DIRECTED; Therapy: 43KMQ5222 to (Evaluate:28Ecj4710)  Requested for: 55KUU0613; Last   Rx:30Nov2016 Ordered   3  Fluticasone Propionate 50 MCG/ACT Nasal Suspension; USE 2 SPRAYS IN EACH   NOSTRIL DAILY; Therapy: 28GGK5245 to (Edmundo Isaac)  Requested for: 95Ztw8708 Ordered   4  KlonoPIN 0 5 MG Oral Tablet (ClonazePAM); take 4 tablets at bedtime; Therapy: (Recorded:12Oct2015) to Recorded   5  Minivelle 0 05 MG/24HR Transdermal Patch Twice Weekly; APPLY 1 PATCH TWICE   WEEKLY AS DIRECTED; Therapy: 08Esz4886 to ([facility])  Requested for: 67BVE7809; Last   Rx:01Jun2016 Ordered   6  Mupirocin Calcium 2 % External Cream (Bactroban); APPLY AND GENTLY MASSAGE   INTO AFFECTED AREA(S) TWICE DAILY  Requested for: 93Gdo7658; Last   Rx:25Lcv5297 Ordered   7  Ondansetron 8 MG Oral Tablet Dispersible; TAKE 1 TABLET Every 8 hours PRN nausea; Therapy: 29LYF5787 to (Last Rx:29Jun2016) Ordered   8  PredniSONE 10 MG Oral Tablet; 5 tabs for 2 days, 4 tabs for two days, three tabs for two   days, two tabs for two days, one tab for two days; Therapy: 62Ccz7249 to (Last Rx:10Sep2016)  Requested for: 62Vmp4127 Ordered   9   Synthroid 112 MCG Oral Tablet (Levothyroxine Sodium); Take 1 tablet daily; Therapy: 11IHU8199 to (Evaluate:09Jan2017)  Requested for: 51CBK7894; Last   Rx:15Jan2016 Ordered   10  TraMADol HCl - 50 MG Oral Tablet; TAKE 1 TABLET EVERY 6 HOURS AS NEEDED FOR    PAIN;    Therapy: 29RIZ5652 to (Evaluate:02Nov2016); Last Rx:72Xvg3084 Ordered   11  Triamcinolone Acetonide 0 5 % External Cream; APPLY TO THE AFFECTED AREAS 3    TIMES DAILY AS NEEDED; Therapy: 61GVS0251 to (Last Rx:16Sep2016)  Requested for: 15Uwv8623 Ordered   12  Vyvanse 70 MG Oral Capsule Recorded    Allergies    1   No Known Drug Allergies    Signatures   Electronically signed by : Neha Canales LPN; Nov 30 0265  0:19JQ EST                       (Author)

## 2018-01-24 VITALS
HEIGHT: 63 IN | BODY MASS INDEX: 23.04 KG/M2 | TEMPERATURE: 97.7 F | SYSTOLIC BLOOD PRESSURE: 110 MMHG | DIASTOLIC BLOOD PRESSURE: 82 MMHG | WEIGHT: 130 LBS

## 2018-03-07 NOTE — PROGRESS NOTES
Discussion/Summary    [facility] papers completed        Signatures   Electronically signed by : Teresa Santiago DO; Jul 20 2016  8:44AM EST                       (Author)

## 2018-03-22 DIAGNOSIS — N95.1 SYMPTOMATIC MENOPAUSAL OR FEMALE CLIMACTERIC STATES: Primary | ICD-10-CM

## 2018-03-22 RX ORDER — ESTRADIOL 0.05 MG/D
FILM, EXTENDED RELEASE TRANSDERMAL
Qty: 90 PATCH | Refills: 0 | Status: SHIPPED | OUTPATIENT
Start: 2018-03-22 | End: 2018-07-26

## 2018-04-11 NOTE — TELEPHONE ENCOUNTER
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

## 2018-04-12 NOTE — TELEPHONE ENCOUNTER
Patient is requesting refill on Vyvanse  She should be seen before refill is done  She was last seen in December

## 2018-07-13 ENCOUNTER — ANNUAL EXAM (OUTPATIENT)
Dept: OBGYN CLINIC | Facility: MEDICAL CENTER | Age: 58
End: 2018-07-13
Payer: COMMERCIAL

## 2018-07-13 ENCOUNTER — HOSPITAL ENCOUNTER (OUTPATIENT)
Dept: RADIOLOGY | Facility: MEDICAL CENTER | Age: 58
Discharge: HOME | End: 2018-07-13
Payer: COMMERCIAL

## 2018-07-13 VITALS
DIASTOLIC BLOOD PRESSURE: 72 MMHG | HEIGHT: 62 IN | BODY MASS INDEX: 25.69 KG/M2 | SYSTOLIC BLOOD PRESSURE: 132 MMHG | WEIGHT: 139.6 LBS

## 2018-07-13 DIAGNOSIS — Z12.31 ENCOUNTER FOR SCREENING MAMMOGRAM FOR MALIGNANT NEOPLASM OF BREAST: ICD-10-CM

## 2018-07-13 DIAGNOSIS — L92.9 GRANULATION TISSUE: ICD-10-CM

## 2018-07-13 DIAGNOSIS — Z78.0 MENOPAUSE: ICD-10-CM

## 2018-07-13 DIAGNOSIS — Z01.411 ENCOUNTER FOR GYNECOLOGICAL EXAMINATION WITH ABNORMAL FINDING: Primary | ICD-10-CM

## 2018-07-13 PROCEDURE — 99396 PREV VISIT EST AGE 40-64: CPT | Performed by: OBSTETRICS & GYNECOLOGY

## 2018-07-13 PROCEDURE — 77063 BREAST TOMOSYNTHESIS BI: CPT

## 2018-07-13 PROCEDURE — 77067 SCR MAMMO BI INCL CAD: CPT

## 2018-07-13 RX ORDER — FLUTICASONE PROPIONATE 50 MCG
2 SPRAY, SUSPENSION (ML) NASAL DAILY
COMMUNITY
Start: 2014-02-14

## 2018-07-13 RX ORDER — ESTRADIOL 0.05 MG/D
1 FILM, EXTENDED RELEASE TRANSDERMAL 2 TIMES WEEKLY
Qty: 8 PATCH | Refills: 0 | Status: SHIPPED | OUTPATIENT
Start: 2018-07-16 | End: 2018-07-26

## 2018-07-13 RX ORDER — CLONAZEPAM 0.5 MG/1
4 TABLET ORAL
COMMUNITY
End: 2018-07-26 | Stop reason: SDUPTHER

## 2018-07-13 RX ORDER — LEVOTHYROXINE SODIUM 112 UG/1
1 TABLET ORAL DAILY
COMMUNITY
Start: 2012-11-13 | End: 2018-10-29 | Stop reason: SDUPTHER

## 2018-07-13 RX ORDER — TRAZODONE HYDROCHLORIDE 50 MG/1
1 TABLET ORAL
COMMUNITY
End: 2018-07-26 | Stop reason: SDUPTHER

## 2018-07-13 RX ORDER — ESTRADIOL 0.05 MG/D
1 FILM, EXTENDED RELEASE TRANSDERMAL
COMMUNITY
Start: 2016-11-30 | End: 2018-07-13 | Stop reason: SDUPTHER

## 2018-07-13 RX ORDER — ESTRADIOL 0.05 MG/D
1 FILM, EXTENDED RELEASE TRANSDERMAL WEEKLY
Qty: 24 PATCH | Refills: 3 | Status: SHIPPED | OUTPATIENT
Start: 2018-07-13 | End: 2019-02-26 | Stop reason: SDUPTHER

## 2018-07-13 RX ORDER — TRAMADOL HYDROCHLORIDE 50 MG/1
TABLET ORAL EVERY 6 HOURS
COMMUNITY
End: 2018-07-26 | Stop reason: SDUPTHER

## 2018-07-13 NOTE — PROGRESS NOTES
Assessment/Plan:    No problem-specific Assessment & Plan notes found for this encounter  Diagnoses and all orders for this visit:    Menopause  -     estradiol (VIVELLE-DOT) 0 05 MG/24HR; Place 1 patch on the skin once a week  -     estradiol (VIVELLE-DOT) 0 05 MG/24HR; Place 1 patch on the skin 2 (two) times a week    Encounter for screening mammogram for malignant neoplasm of breast  -     Mammo screening bilateral w 3d & cad; Future    Encounter for gynecological examination with abnormal finding    Granulation tissue    Other orders  -     Cancel: Liquid-based pap, screening  -     clonazePAM (KlonoPIN) 0 5 mg tablet; Take 4 tablets by mouth  -     Discontinue: estradiol (VIVELLE-DOT) 0 05 MG/24HR; Place 1 patch on the skin  -     fluticasone (FLONASE) 50 mcg/act nasal spray; 2 sprays into each nostril daily  -     levothyroxine (SYNTHROID) 112 mcg tablet; Take 1 tablet by mouth daily  -     traMADol (ULTRAM) 50 mg tablet; Take by mouth every 6 (six) hours  -     traZODone (DESYREL) 50 mg tablet; Take 1 tablet by mouth        Annual examination was completed  3D mammogram was ordered  After verbal consent was obtained patient underwent excision of the vaginal granulation tissue this was then discarded as this had exact appearance as last year's examination  Silver nitrate was applied to the base and hemostasis was noted be excellent  Patient was advised no intercourse nothing in the vagina and no heavy lifting for the next week and then she may resume normal activity  Patient to return in 1 year or as necessary  Subjective:      Patient ID: Chiquita Licona is a 63 y o  female  Patient returns for annual gyn visit  Patient also with secondary issue of persistent vaginal spotting as well as vaginal discharge  Patient never did return for excision of vaginal granulation tissue  Patient is doing well with estrogen replacement therapy  She is currently working as a traveling nurse        Gynecologic Exam         The following portions of the patient's history were reviewed and updated as appropriate: allergies, current medications, past family history, past medical history, past social history, past surgical history and problem list     Review of Systems   All other systems reviewed and are negative  Objective:      /72 (BP Location: Left arm, Patient Position: Sitting, Cuff Size: Standard)   Ht 5' 2" (1 575 m)   Wt 63 3 kg (139 lb 9 6 oz)   LMP 04/25/2016   BMI 25 53 kg/m²          Physical Exam   Constitutional: She is oriented to person, place, and time  She appears well-developed and well-nourished  HENT:   Head: Normocephalic and atraumatic  Neck: Normal range of motion  Neck supple  No thyromegaly present  Cardiovascular: Normal rate, regular rhythm and normal heart sounds  Pulmonary/Chest: Effort normal and breath sounds normal  No respiratory distress  Breasts no masses or skin changes   Abdominal: Soft  Bowel sounds are normal  She exhibits no distension and no mass  There is no tenderness  Genitourinary:   Genitourinary Comments: Ext genitalia nl, no lesions  Vagina healthy w/o lesions or discharge; cuff intact and well-supported; 1 cm granulation tissue at the right lateral cuff consistent with last year's exam   Adnexa no masses or tenderness  Rectal no masses   Musculoskeletal: She exhibits no edema or deformity  Neurological: She is alert and oriented to person, place, and time  She has normal reflexes  Skin: Skin is warm and dry  No rash noted  Psychiatric: She has a normal mood and affect  Her behavior is normal    Nursing note and vitals reviewed

## 2018-07-16 ENCOUNTER — TELEPHONE (OUTPATIENT)
Dept: OBGYN CLINIC | Facility: CLINIC | Age: 58
End: 2018-07-16

## 2018-07-20 ENCOUNTER — TELEPHONE (OUTPATIENT)
Dept: OBGYN CLINIC | Facility: CLINIC | Age: 58
End: 2018-07-20

## 2018-07-26 ENCOUNTER — OFFICE VISIT (OUTPATIENT)
Dept: FAMILY MEDICINE CLINIC | Facility: CLINIC | Age: 58
End: 2018-07-26
Payer: COMMERCIAL

## 2018-07-26 VITALS
HEART RATE: 70 BPM | OXYGEN SATURATION: 99 % | BODY MASS INDEX: 22.82 KG/M2 | DIASTOLIC BLOOD PRESSURE: 78 MMHG | HEIGHT: 65 IN | SYSTOLIC BLOOD PRESSURE: 114 MMHG | TEMPERATURE: 98.1 F | WEIGHT: 137 LBS

## 2018-07-26 DIAGNOSIS — F98.8 ATTENTION DEFICIT DISORDER (ADD) WITHOUT HYPERACTIVITY: ICD-10-CM

## 2018-07-26 DIAGNOSIS — E03.9 PRIMARY HYPOTHYROIDISM: ICD-10-CM

## 2018-07-26 DIAGNOSIS — F33.42 RECURRENT MAJOR DEPRESSIVE DISORDER, IN FULL REMISSION (HCC): Primary | ICD-10-CM

## 2018-07-26 PROCEDURE — 99214 OFFICE O/P EST MOD 30 MIN: CPT | Performed by: FAMILY MEDICINE

## 2018-07-26 PROCEDURE — 3008F BODY MASS INDEX DOCD: CPT | Performed by: FAMILY MEDICINE

## 2018-07-26 RX ORDER — CLONAZEPAM 0.5 MG/1
0.5 TABLET ORAL 2 TIMES DAILY
Qty: 60 TABLET | Refills: 2 | Status: SHIPPED | OUTPATIENT
Start: 2018-07-26 | End: 2018-10-29 | Stop reason: SDUPTHER

## 2018-07-26 RX ORDER — TRAMADOL HYDROCHLORIDE 50 MG/1
50 TABLET ORAL EVERY 6 HOURS PRN
Qty: 30 TABLET | Refills: 0 | Status: CANCELLED | OUTPATIENT
Start: 2018-07-26

## 2018-07-26 RX ORDER — TRAMADOL HYDROCHLORIDE 50 MG/1
50 TABLET ORAL EVERY 6 HOURS
Qty: 30 TABLET | Refills: 0 | Status: SHIPPED | OUTPATIENT
Start: 2018-07-26 | End: 2018-10-29

## 2018-07-26 RX ORDER — TRAZODONE HYDROCHLORIDE 50 MG/1
50 TABLET ORAL
Qty: 30 TABLET | Refills: 2 | Status: SHIPPED | OUTPATIENT
Start: 2018-07-26 | End: 2018-10-29 | Stop reason: SDUPTHER

## 2018-07-26 NOTE — PROGRESS NOTES
Assessment/Plan:  Refills on all medications prescribed  Recommend return to office for recheck if no improvement or worsening symptoms  Medications reviewed today with patient  Side effect profile reviewed  Recommend return to office in 4-6 months for recheck  Sooner if needed  No problem-specific Assessment & Plan notes found for this encounter  Diagnoses and all orders for this visit:    Recurrent major depressive disorder, in full remission ([facility] [address] )  -     clonazePAM (KlonoPIN) 0 5 mg tablet; Take 1 tablet (0 5 mg total) by mouth 2 (two) times a day  -     Discontinue: lisdexamfetamine (VYVANSE) 70 MG capsule; Take 1 capsule (70 mg total) by mouth every morning Earliest Fill Date: 7/26/18 Max Daily Amount: 70 mg  -     traZODone (DESYREL) 50 mg tablet; Take 1 tablet (50 mg total) by mouth daily at bedtime  -     traMADol (ULTRAM) 50 mg tablet; Take 1 tablet (50 mg total) by mouth every 6 (six) hours  -     Discontinue: lisdexamfetamine (VYVANSE) 70 MG capsule; Take 1 capsule (70 mg total) by mouth every morning Max Daily Amount: 70 mg  -     lisdexamfetamine (VYVANSE) 70 MG capsule; Take 1 capsule (70 mg total) by mouth every morning Max Daily Amount: 70 mg    Attention deficit disorder (ADD) without hyperactivity    Primary hypothyroidism    Other orders  -     Cancel: traMADol (ULTRAM) 50 mg tablet; Take 1 tablet (50 mg total) by mouth every 6 (six) hours as needed for moderate pain          Subjective:      Patient ID: Bobbi Ramsay is a 63 y o  female  Patient here for recheck on chronic conditions  She is generally feeling well  No significant concerns or complaints today  She states depression is under good control  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  She notes that medication has been helpful  No side effects  No sleep disturbance  She does work shift work and does take medication for sleep          The following portions of the patient's history were reviewed and updated as appropriate: allergies, current medications, past family history, past medical history, past social history, past surgical history and problem list     Review of Systems   Constitutional: Negative  HENT: Negative  Eyes: Negative  Respiratory: Negative  Cardiovascular: Negative  Gastrointestinal: Negative  Endocrine: Negative  Genitourinary: Negative  Musculoskeletal: Negative  Skin: Negative  Allergic/Immunologic: Negative  Neurological: Negative  Hematological: Negative  Psychiatric/Behavioral: Negative  Objective:      /78 (BP Location: Left arm, Patient Position: Sitting, Cuff Size: Standard)   Pulse 70   Temp 98 1 °F (36 7 °C) (Tympanic)   Ht 5' 4 57" (1 64 m)   Wt 62 1 kg (137 lb)   LMP 04/25/2016   SpO2 99%   BMI 23 10 kg/m²          Physical Exam   Constitutional: She is oriented to person, place, and time  She appears well-developed and well-nourished  HENT:   Head: Normocephalic and atraumatic  Right Ear: External ear normal  Tympanic membrane is not erythematous and not bulging  Left Ear: External ear normal  Tympanic membrane is not erythematous and not bulging  Nose: Nose normal    Mouth/Throat: Oropharynx is clear and moist and mucous membranes are normal  No oral lesions  No oropharyngeal exudate  Eyes: Conjunctivae and EOM are normal  Right eye exhibits no discharge  Left eye exhibits no discharge  No scleral icterus  Neck: Normal range of motion  Neck supple  No thyromegaly present  Cardiovascular: Normal rate, regular rhythm and normal heart sounds  Exam reveals no gallop and no friction rub  No murmur heard  Pulmonary/Chest: Effort normal  No respiratory distress  She has no wheezes  She has no rales  She exhibits no tenderness  Abdominal: Soft  Bowel sounds are normal  She exhibits no distension and no mass  There is no tenderness  There is no rebound and no guarding     Musculoskeletal: Normal range of motion  She exhibits no edema, tenderness or deformity  Lymphadenopathy:     She has no cervical adenopathy  Neurological: She is alert and oriented to person, place, and time  She has normal reflexes  No cranial nerve deficit  She exhibits normal muscle tone  Coordination normal    Skin: Skin is warm and dry  No rash noted  No erythema  No pallor  Psychiatric: She has a normal mood and affect  Her behavior is normal    Vitals reviewed

## 2018-09-24 ENCOUNTER — TELEPHONE (OUTPATIENT)
Dept: FAMILY MEDICINE CLINIC | Facility: CLINIC | Age: 58
End: 2018-09-24

## 2018-09-25 ENCOUNTER — TELEPHONE (OUTPATIENT)
Dept: FAMILY MEDICINE CLINIC | Facility: CLINIC | Age: 58
End: 2018-09-25

## 2018-09-25 DIAGNOSIS — F33.42 RECURRENT MAJOR DEPRESSIVE DISORDER, IN FULL REMISSION (HCC): ICD-10-CM

## 2018-09-25 NOTE — TELEPHONE ENCOUNTER
Pharmacist called, stating that pt had multiple printed scripts for Vyvanse all dated 7/26/18, and pt has been trying to fill them monthly  Pharmacist states that she cant fill them when they have the same date on them, they must be a month apart  Cant tell in Epic how many were printed at the time they were ordered, it looks like just one was printed  He states that he is not going to fill, he will have pt contact the office

## 2018-09-26 ENCOUNTER — TELEPHONE (OUTPATIENT)
Dept: FAMILY MEDICINE CLINIC | Facility: CLINIC | Age: 58
End: 2018-09-26

## 2018-09-26 DIAGNOSIS — F33.42 RECURRENT MAJOR DEPRESSIVE DISORDER, IN FULL REMISSION (HCC): ICD-10-CM

## 2018-09-26 NOTE — TELEPHONE ENCOUNTER
E prescription was initially sent to the [facility] pharmacy in [facility]  Please call left pharmacy and give them a verbal order to not fill that prescription as we will be sending it to [address] [place] [address] as patient has requested

## 2018-09-26 NOTE — TELEPHONE ENCOUNTER
Patient explained that her medication Vyvance need to be e precribed to [address] in the [address] [place]  She filled to paper script with the same start date but they wont let her fill the third script due to having the same exact start date, so pharmacy wants this e prescribed with a different start date  Please advise

## 2018-10-29 ENCOUNTER — OFFICE VISIT (OUTPATIENT)
Dept: FAMILY MEDICINE CLINIC | Facility: CLINIC | Age: 58
End: 2018-10-29
Payer: COMMERCIAL

## 2018-10-29 VITALS
DIASTOLIC BLOOD PRESSURE: 80 MMHG | WEIGHT: 140 LBS | HEIGHT: 63 IN | TEMPERATURE: 97.8 F | BODY MASS INDEX: 24.8 KG/M2 | SYSTOLIC BLOOD PRESSURE: 130 MMHG

## 2018-10-29 DIAGNOSIS — F98.8 ATTENTION DEFICIT DISORDER (ADD) WITHOUT HYPERACTIVITY: ICD-10-CM

## 2018-10-29 DIAGNOSIS — E03.9 PRIMARY HYPOTHYROIDISM: ICD-10-CM

## 2018-10-29 DIAGNOSIS — F33.42 RECURRENT MAJOR DEPRESSIVE DISORDER, IN FULL REMISSION (HCC): ICD-10-CM

## 2018-10-29 DIAGNOSIS — G43.709 CHRONIC MIGRAINE WITHOUT AURA WITHOUT STATUS MIGRAINOSUS, NOT INTRACTABLE: ICD-10-CM

## 2018-10-29 DIAGNOSIS — E03.9 HYPOTHYROIDISM, UNSPECIFIED TYPE: Primary | ICD-10-CM

## 2018-10-29 PROCEDURE — 3008F BODY MASS INDEX DOCD: CPT | Performed by: FAMILY MEDICINE

## 2018-10-29 PROCEDURE — 1036F TOBACCO NON-USER: CPT | Performed by: FAMILY MEDICINE

## 2018-10-29 PROCEDURE — 99214 OFFICE O/P EST MOD 30 MIN: CPT | Performed by: FAMILY MEDICINE

## 2018-10-29 RX ORDER — BUTALBITAL, ASPIRIN AND CAFFEINE 50; 325; 40 MG/1; MG/1; MG/1
1 TABLET ORAL EVERY 4 HOURS PRN
Qty: 90 TABLET | Refills: 1 | Status: SHIPPED | OUTPATIENT
Start: 2018-10-29 | End: 2020-01-30 | Stop reason: SDUPTHER

## 2018-10-29 RX ORDER — TRAZODONE HYDROCHLORIDE 50 MG/1
75 TABLET ORAL
Qty: 135 TABLET | Refills: 1 | Status: SHIPPED | OUTPATIENT
Start: 2018-10-29 | End: 2019-09-26 | Stop reason: SDUPTHER

## 2018-10-29 RX ORDER — LEVOTHYROXINE SODIUM 112 UG/1
112 TABLET ORAL DAILY
Qty: 90 TABLET | Refills: 1 | Status: SHIPPED | OUTPATIENT
Start: 2018-10-29 | End: 2018-12-10 | Stop reason: SDUPTHER

## 2018-10-29 RX ORDER — CLONAZEPAM 0.5 MG/1
0.5 TABLET ORAL 2 TIMES DAILY
Qty: 180 TABLET | Refills: 1 | Status: SHIPPED | OUTPATIENT
Start: 2018-10-29 | End: 2019-09-26 | Stop reason: SDUPTHER

## 2018-10-29 NOTE — PROGRESS NOTES
Assessment/Plan:    No problem-specific Assessment & Plan notes found for this encounter  Diagnoses and all orders for this visit:    Hypothyroidism, unspecified type  Comments:  Await thyroid results  Adjust medication as needed  Orders:  -     levothyroxine (SYNTHROID) 112 mcg tablet; Take 1 tablet (112 mcg total) by mouth daily  -     TSH, 3rd generation with Free T4 reflex; Future    Recurrent major depressive disorder, in full remission ([facility] [address] )  -     traZODone (DESYREL) 50 mg tablet; Take 1 5 tablets (75 mg total) by mouth daily at bedtime for 90 days  -     Discontinue: lisdexamfetamine (VYVANSE) 70 MG capsule; Take 1 capsule (70 mg total) by mouth every morning Earliest Fill Date: 11/15/18 Max Daily Amount: 70 mg  -     clonazePAM (KlonoPIN) 0 5 mg tablet; Take 1 tablet (0 5 mg total) by mouth 2 (two) times a day for 90 days  -     Discontinue: lisdexamfetamine (VYVANSE) 70 MG capsule; Take 1 capsule (70 mg total) by mouth every morning Earliest Fill Date: 12/15/18 Max Daily Amount: 70 mg  -     Discontinue: lisdexamfetamine (VYVANSE) 70 MG capsule; Take 1 capsule (70 mg total) by mouth every morning Earliest Fill Date: 1/15/19 Max Daily Amount: 70 mg  -     Discontinue: lisdexamfetamine (VYVANSE) 70 MG capsule; Take 1 capsule (70 mg total) by mouth every morning Earliest Fill Date: 2/15/19 Max Daily Amount: 70 mg  -     Discontinue: lisdexamfetamine (VYVANSE) 70 MG capsule; Take 1 capsule (70 mg total) by mouth every morning Earliest Fill Date: 3/15/19 Max Daily Amount: 70 mg  -     lisdexamfetamine (VYVANSE) 70 MG capsule; Take 1 capsule (70 mg total) by mouth every morning Earliest Fill Date: 4/15/19 Max Daily Amount: 70 mg    Primary hypothyroidism    Chronic migraine without aura without status migrainosus, not intractable  Comments:  No increased frequency or severity of headaches  Continue current medication as needed    Orders:  -     butalbital-aspirin-caffeine (BUTALBITAL COMPOUND/ASA) -40 MG per tablet; Take 1 tablet by mouth every 4 (four) hours as needed for headaches    Attention deficit disorder (ADD) without hyperactivity  Comments:  One month refills given times 6 for the next 6 months while she is in [place]  Return to office for recheck in 6 months  Subjective:      Patient ID: Romaine Gonzalez is a 63 y o  female  Patient is here for recheck on chronic medical conditions  She has history of hypothyroidism and is due for thyroid recheck  She also has history of attention deficit disorder and has done well for several years on Vyvanse  She is moving to [place] for 6 months for a job and will be back here in the spring  She is requesting 6 months of prescription medication until she returns  She has been stable on all current medication  She needs refill on medication for chronic intermittent migraines  No worsening in frequency or severity of migraines  No diplopia  Medication Refill         The following portions of the patient's history were reviewed and updated as appropriate: allergies, current medications, past family history, past medical history, past social history, past surgical history and problem list     Review of Systems   Constitutional: Negative  HENT: Negative  Eyes: Negative  Respiratory: Negative  Cardiovascular: Negative  Gastrointestinal: Negative  Endocrine: Negative  Genitourinary: Negative  Musculoskeletal: Negative  Skin: Negative  Allergic/Immunologic: Negative  Neurological: Negative  Hematological: Negative  Psychiatric/Behavioral: Negative  Objective:      /80 (BP Location: Left arm, Patient Position: Sitting, Cuff Size: Adult)   Temp 97 8 °F (36 6 °C)   Ht 5' 2 6" (1 59 m)   Wt 63 5 kg (140 lb)   LMP 04/25/2016   BMI 25 12 kg/m²          Physical Exam   Constitutional: She is oriented to person, place, and time  She appears well-developed and well-nourished  HENT:   Head: Normocephalic and atraumatic  Right Ear: External ear normal  Tympanic membrane is not erythematous and not bulging  Left Ear: External ear normal  Tympanic membrane is not erythematous and not bulging  Nose: Nose normal    Mouth/Throat: Oropharynx is clear and moist and mucous membranes are normal  No oral lesions  No oropharyngeal exudate  Eyes: Conjunctivae and EOM are normal  Right eye exhibits no discharge  Left eye exhibits no discharge  No scleral icterus  Neck: Normal range of motion  Neck supple  No thyromegaly present  Cardiovascular: Normal rate, regular rhythm and normal heart sounds  Exam reveals no gallop and no friction rub  No murmur heard  Pulmonary/Chest: Effort normal  No respiratory distress  She has no wheezes  She has no rales  She exhibits no tenderness  Abdominal: Soft  Bowel sounds are normal  She exhibits no distension and no mass  There is no tenderness  There is no rebound and no guarding  Musculoskeletal: Normal range of motion  She exhibits no edema, tenderness or deformity  Lymphadenopathy:     She has no cervical adenopathy  Neurological: She is alert and oriented to person, place, and time  She has normal reflexes  No cranial nerve deficit  She exhibits normal muscle tone  Coordination normal    Skin: Skin is warm and dry  No rash noted  No erythema  No pallor  Psychiatric: She has a normal mood and affect  Her behavior is normal    Vitals reviewed

## 2018-12-07 ENCOUNTER — TELEPHONE (OUTPATIENT)
Dept: FAMILY MEDICINE CLINIC | Facility: CLINIC | Age: 58
End: 2018-12-07

## 2018-12-07 NOTE — TELEPHONE ENCOUNTER
Patient requested drug auth for vyvanse but in the meantime would like an alternative medication for ADD plz advise

## 2018-12-10 DIAGNOSIS — E03.9 HYPOTHYROIDISM, UNSPECIFIED TYPE: ICD-10-CM

## 2018-12-10 RX ORDER — LEVOTHYROXINE SODIUM 112 UG/1
112 TABLET ORAL DAILY
Qty: 90 TABLET | Refills: 0 | Status: CANCELLED | OUTPATIENT
Start: 2018-12-10

## 2018-12-11 RX ORDER — LEVOTHYROXINE SODIUM 112 UG/1
112 TABLET ORAL DAILY
Qty: 90 TABLET | Refills: 0 | Status: SHIPPED | OUTPATIENT
Start: 2018-12-11 | End: 2019-09-26 | Stop reason: ALTCHOICE

## 2018-12-31 ENCOUNTER — TELEPHONE (OUTPATIENT)
Dept: FAMILY MEDICINE CLINIC | Facility: CLINIC | Age: 58
End: 2018-12-31

## 2018-12-31 DIAGNOSIS — E03.9 PRIMARY HYPOTHYROIDISM: Primary | ICD-10-CM

## 2018-12-31 NOTE — TELEPHONE ENCOUNTER
Please clarify with patient   does she need a prescription for her thyroid medication or for thyroid blood testing (TSH)?

## 2019-02-13 DIAGNOSIS — F33.42 RECURRENT MAJOR DEPRESSIVE DISORDER, IN FULL REMISSION (HCC): ICD-10-CM

## 2019-02-13 NOTE — TELEPHONE ENCOUNTER
I'm sorry, I should have given more detail  Pt was in 10/29/18 and at that time stated that she will be temporarily in [facility] for 6 mos  ( In the note, you state she is to come back in 6 mos, which would be April 2019 ) At that time you gave her 6 printed scripts for her Vyvanse, but they were all dated on that date  The pharmacy in [facility] states that they are only good for 3 months, and will no longer fill them, as they are all dated 10/29/18, but state that they will accept an electronic script

## 2019-02-14 ENCOUNTER — TELEPHONE (OUTPATIENT)
Dept: FAMILY MEDICINE CLINIC | Facility: CLINIC | Age: 59
End: 2019-02-14

## 2019-02-14 NOTE — TELEPHONE ENCOUNTER
Pt called back, explaining that since you gave her multiple printed prescriptions at her last OV there is an expiration date of January on them and now she can no longer use it  She goes to [facility] [place] for the entire winter and is still residing there  She did confirm that the pharmacy would accept an e-script of this medication if it could please be sent

## 2019-02-14 NOTE — TELEPHONE ENCOUNTER
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

## 2019-02-14 NOTE — TELEPHONE ENCOUNTER
Pt had BW done on 01/17/19 in [place] to check her thyroid - she wants to know if you can review the results? It is in her lab section scanned in as an external order   She will need a new prescription sent for Levothyroxine sent to the [address] [place] [address] FOR SPECIAL SURGERY

## 2019-02-14 NOTE — TELEPHONE ENCOUNTER
Pt called back stating that her Mail order - [facility]  would also accept it for a 90 day supply    Fax [phone]

## 2019-02-18 NOTE — TELEPHONE ENCOUNTER
Please check in with patient to see if she going to be coming back to [place] in the next 1-2 months  Her lab testing is abnormal   If she is not coming back soon recommend follow-up with physician in [place]

## 2019-02-26 DIAGNOSIS — Z78.0 MENOPAUSE: ICD-10-CM

## 2019-02-26 RX ORDER — ESTRADIOL 0.05 MG/D
1 FILM, EXTENDED RELEASE TRANSDERMAL WEEKLY
Qty: 24 PATCH | Refills: 3 | Status: SHIPPED | OUTPATIENT
Start: 2019-02-26 | End: 2020-01-30 | Stop reason: SDUPTHER

## 2019-03-04 ENCOUNTER — TELEPHONE (OUTPATIENT)
Dept: FAMILY MEDICINE CLINIC | Facility: CLINIC | Age: 59
End: 2019-03-04

## 2019-03-04 NOTE — TELEPHONE ENCOUNTER
Left message for pt to call back  Received faxed request for 2 medications from [facility]    Clonazepam and Trazadone  Please verify pt address  The address on the form reads as [address] [place] [address] Drive [address] [place] [address]  If this is indeed her new address we need to update file and make Dr Akbar Parents aware of this  These are controlled substances

## 2019-04-10 DIAGNOSIS — Z78.0 MENOPAUSE: ICD-10-CM

## 2019-04-15 ENCOUNTER — TELEPHONE (OUTPATIENT)
Dept: OBGYN CLINIC | Facility: CLINIC | Age: 59
End: 2019-04-15

## 2019-05-06 NOTE — TELEPHONE ENCOUNTER
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

## 2019-07-01 ENCOUNTER — TELEPHONE (OUTPATIENT)
Dept: FAMILY MEDICINE CLINIC | Facility: CLINIC | Age: 59
End: 2019-07-01

## 2019-07-01 NOTE — TELEPHONE ENCOUNTER
Received a request for release of health information to release Mental health records to patients new physician office at [address]    Request sent to [facility] today for processing

## 2019-09-19 ENCOUNTER — TELEPHONE (OUTPATIENT)
Dept: FAMILY MEDICINE CLINIC | Facility: CLINIC | Age: 59
End: 2019-09-19

## 2019-09-26 ENCOUNTER — OFFICE VISIT (OUTPATIENT)
Dept: FAMILY MEDICINE CLINIC | Facility: CLINIC | Age: 59
End: 2019-09-26
Payer: COMMERCIAL

## 2019-09-26 VITALS
TEMPERATURE: 98.6 F | DIASTOLIC BLOOD PRESSURE: 78 MMHG | SYSTOLIC BLOOD PRESSURE: 108 MMHG | OXYGEN SATURATION: 97 % | HEIGHT: 63 IN | WEIGHT: 145 LBS | HEART RATE: 78 BPM | BODY MASS INDEX: 25.69 KG/M2

## 2019-09-26 DIAGNOSIS — E03.9 PRIMARY HYPOTHYROIDISM: Primary | ICD-10-CM

## 2019-09-26 DIAGNOSIS — F90.9 ADULT ADHD (ATTENTION DEFICIT HYPERACTIVITY DISORDER): ICD-10-CM

## 2019-09-26 DIAGNOSIS — F98.8 ATTENTION DEFICIT DISORDER (ADD) WITHOUT HYPERACTIVITY: ICD-10-CM

## 2019-09-26 DIAGNOSIS — F33.42 RECURRENT MAJOR DEPRESSIVE DISORDER, IN FULL REMISSION (HCC): ICD-10-CM

## 2019-09-26 PROCEDURE — 3008F BODY MASS INDEX DOCD: CPT | Performed by: FAMILY MEDICINE

## 2019-09-26 PROCEDURE — 99214 OFFICE O/P EST MOD 30 MIN: CPT | Performed by: FAMILY MEDICINE

## 2019-09-26 RX ORDER — TRAZODONE HYDROCHLORIDE 50 MG/1
75 TABLET ORAL
Qty: 135 TABLET | Refills: 1 | Status: SHIPPED | OUTPATIENT
Start: 2019-09-26 | End: 2019-09-26 | Stop reason: SDUPTHER

## 2019-09-26 RX ORDER — CLONAZEPAM 0.5 MG/1
0.5 TABLET ORAL 2 TIMES DAILY
Qty: 180 TABLET | Refills: 1 | Status: SHIPPED | OUTPATIENT
Start: 2019-09-26 | End: 2019-09-26 | Stop reason: SDUPTHER

## 2019-09-26 RX ORDER — TRAZODONE HYDROCHLORIDE 50 MG/1
75 TABLET ORAL
Qty: 135 TABLET | Refills: 0 | Status: SHIPPED | OUTPATIENT
Start: 2019-09-26 | End: 2020-01-30 | Stop reason: SDUPTHER

## 2019-09-26 RX ORDER — LEVOTHYROXINE SODIUM 88 MCG
88 TABLET ORAL DAILY
Refills: 1 | COMMUNITY
Start: 2019-08-02 | End: 2019-10-02 | Stop reason: SDUPTHER

## 2019-09-26 RX ORDER — CLONAZEPAM 0.5 MG/1
0.5 TABLET ORAL 2 TIMES DAILY
Qty: 180 TABLET | Refills: 0 | Status: SHIPPED | OUTPATIENT
Start: 2019-09-26 | End: 2020-01-30 | Stop reason: SDUPTHER

## 2019-09-26 NOTE — PROGRESS NOTES
BMI Counseling: Body mass index is 26 02 kg/m²  The BMI is above normal  Nutrition recommendations include reducing portion sizes

## 2019-09-26 NOTE — PROGRESS NOTES
Assessment/Plan:  Recommend TSH  Await results  Refills on medications prescribed  No problem-specific Assessment & Plan notes found for this encounter  Diagnoses and all orders for this visit:    Primary hypothyroidism  -     Cancel: TSH, 3rd generation with Free T4 reflex  -     TSH, 3rd generation with Free T4 reflex    Recurrent major depressive disorder, in full remission ([facility] [address] )  -     lisdexamfetamine (VYVANSE) 70 MG capsule; Take 1 capsule (70 mg total) by mouth every morningMax Daily Amount: 70 mg  -     clonazePAM (KlonoPIN) 0 5 mg tablet; Take 1 tablet (0 5 mg total) by mouth 2 (two) times a day  -     traZODone (DESYREL) 50 mg tablet; Take 1 5 tablets (75 mg total) by mouth daily at bedtime    Attention deficit disorder (ADD) without hyperactivity    Adult ADHD (attention deficit hyperactivity disorder)    Other orders  -     SYNTHROID 88 MCG tablet; Take 88 mcg by mouth daily          Subjective:      Patient ID: Isabela Dial is a 63 y o  female  Patient is here for recheck on chronic conditions  She has a history of insomnia and depression with anxiety  The symptoms are stable  Patient also notes that she is due for thyroid recheck  She has been working as a traveling nurse in [place] and recently had her thyroid levels adjusted  She is currently on 88 micrograms of levothyroxine  She does need refills on medication  She is due for TSH  The following portions of the patient's history were reviewed and updated as appropriate: allergies, current medications, past family history, past medical history, past social history, past surgical history and problem list     Review of Systems   Constitutional: Negative  HENT: Negative  Eyes: Negative  Respiratory: Negative  Cardiovascular: Negative  Gastrointestinal: Negative  Endocrine: Negative  Genitourinary: Negative  Musculoskeletal: Negative  Skin: Negative  Allergic/Immunologic: Negative      Neurological: Negative  Hematological: Negative  Psychiatric/Behavioral: Negative  Objective:      /78 (BP Location: Left arm, Patient Position: Sitting, Cuff Size: Standard)   Pulse 78   Temp 98 6 °F (37 °C) (Tympanic)   Ht 5' 2 6" (1 59 m)   Wt 65 8 kg (145 lb)   LMP 04/25/2016   SpO2 97%   BMI 26 02 kg/m²          Physical Exam   Constitutional: She is oriented to person, place, and time  She appears well-developed and well-nourished  HENT:   Head: Normocephalic and atraumatic  Right Ear: External ear normal  Tympanic membrane is not erythematous and not bulging  Left Ear: External ear normal  Tympanic membrane is not erythematous and not bulging  Nose: Nose normal    Mouth/Throat: Oropharynx is clear and moist and mucous membranes are normal  No oral lesions  No oropharyngeal exudate  Eyes: Conjunctivae and EOM are normal  Right eye exhibits no discharge  Left eye exhibits no discharge  No scleral icterus  Neck: Normal range of motion  Neck supple  No thyromegaly present  Cardiovascular: Normal rate, regular rhythm and normal heart sounds  Exam reveals no gallop and no friction rub  No murmur heard  Pulmonary/Chest: Effort normal  No respiratory distress  She has no wheezes  She has no rales  She exhibits no tenderness  Abdominal: Soft  Bowel sounds are normal  She exhibits no distension and no mass  There is no tenderness  There is no rebound and no guarding  Musculoskeletal: Normal range of motion  She exhibits no edema, tenderness or deformity  Lymphadenopathy:     She has no cervical adenopathy  Neurological: She is alert and oriented to person, place, and time  She has normal reflexes  No cranial nerve deficit  She exhibits normal muscle tone  Coordination normal    Skin: Skin is warm and dry  No rash noted  No erythema  No pallor  Psychiatric: She has a normal mood and affect  Her behavior is normal    Vitals reviewed

## 2019-09-27 LAB — TSH SERPL DL<=0.005 MIU/L-ACNC: 1.11 UIU/ML (ref 0.45–4.5)

## 2019-10-02 DIAGNOSIS — E03.9 PRIMARY HYPOTHYROIDISM: Primary | ICD-10-CM

## 2019-10-03 RX ORDER — LEVOTHYROXINE SODIUM 88 MCG
88 TABLET ORAL DAILY
Qty: 90 TABLET | Refills: 2 | Status: SHIPPED | OUTPATIENT
Start: 2019-10-03 | End: 2020-07-14

## 2019-10-22 DIAGNOSIS — F33.42 RECURRENT MAJOR DEPRESSIVE DISORDER, IN FULL REMISSION (HCC): ICD-10-CM

## 2019-10-30 ENCOUNTER — HOSPITAL ENCOUNTER (OUTPATIENT)
Dept: MAMMOGRAPHY | Facility: HOSPITAL | Age: 59
Discharge: HOME | End: 2019-10-30
Payer: COMMERCIAL

## 2019-10-30 VITALS — HEIGHT: 64 IN | WEIGHT: 135 LBS | BODY MASS INDEX: 23.05 KG/M2

## 2019-10-30 DIAGNOSIS — Z12.31 ENCOUNTER FOR SCREENING MAMMOGRAM FOR MALIGNANT NEOPLASM OF BREAST: ICD-10-CM

## 2019-10-30 PROCEDURE — 77063 BREAST TOMOSYNTHESIS BI: CPT

## 2019-10-30 PROCEDURE — 77067 SCR MAMMO BI INCL CAD: CPT

## 2019-11-18 DIAGNOSIS — F33.42 RECURRENT MAJOR DEPRESSIVE DISORDER, IN FULL REMISSION (HCC): ICD-10-CM

## 2019-11-22 ENCOUNTER — TELEPHONE (OUTPATIENT)
Dept: FAMILY MEDICINE CLINIC | Facility: CLINIC | Age: 59
End: 2019-11-22

## 2019-12-26 DIAGNOSIS — F33.42 RECURRENT MAJOR DEPRESSIVE DISORDER, IN FULL REMISSION (HCC): ICD-10-CM

## 2020-01-27 DIAGNOSIS — F33.42 RECURRENT MAJOR DEPRESSIVE DISORDER, IN FULL REMISSION (HCC): ICD-10-CM

## 2020-01-30 ENCOUNTER — OFFICE VISIT (OUTPATIENT)
Dept: FAMILY MEDICINE CLINIC | Facility: CLINIC | Age: 60
End: 2020-01-30
Payer: COMMERCIAL

## 2020-01-30 VITALS
WEIGHT: 140 LBS | HEIGHT: 63 IN | BODY MASS INDEX: 24.8 KG/M2 | DIASTOLIC BLOOD PRESSURE: 78 MMHG | TEMPERATURE: 97.8 F | SYSTOLIC BLOOD PRESSURE: 104 MMHG | HEART RATE: 88 BPM | OXYGEN SATURATION: 98 %

## 2020-01-30 DIAGNOSIS — G43.709 CHRONIC MIGRAINE WITHOUT AURA WITHOUT STATUS MIGRAINOSUS, NOT INTRACTABLE: ICD-10-CM

## 2020-01-30 DIAGNOSIS — F98.8 ATTENTION DEFICIT DISORDER (ADD) WITHOUT HYPERACTIVITY: ICD-10-CM

## 2020-01-30 DIAGNOSIS — F33.42 RECURRENT MAJOR DEPRESSIVE DISORDER, IN FULL REMISSION (HCC): ICD-10-CM

## 2020-01-30 DIAGNOSIS — Z78.0 MENOPAUSE: ICD-10-CM

## 2020-01-30 DIAGNOSIS — E03.9 PRIMARY HYPOTHYROIDISM: Primary | ICD-10-CM

## 2020-01-30 PROCEDURE — 36415 COLL VENOUS BLD VENIPUNCTURE: CPT | Performed by: FAMILY MEDICINE

## 2020-01-30 PROCEDURE — 99214 OFFICE O/P EST MOD 30 MIN: CPT | Performed by: FAMILY MEDICINE

## 2020-01-30 PROCEDURE — 1036F TOBACCO NON-USER: CPT | Performed by: FAMILY MEDICINE

## 2020-01-30 PROCEDURE — 3008F BODY MASS INDEX DOCD: CPT | Performed by: FAMILY MEDICINE

## 2020-01-30 RX ORDER — ESTRADIOL 0.05 MG/D
1 FILM, EXTENDED RELEASE TRANSDERMAL WEEKLY
Qty: 24 PATCH | Refills: 3 | Status: SHIPPED | OUTPATIENT
Start: 2020-01-30 | End: 2020-04-16 | Stop reason: SDUPTHER

## 2020-01-30 RX ORDER — BUTALBITAL, ASPIRIN AND CAFFEINE 50; 325; 40 MG/1; MG/1; MG/1
1 TABLET ORAL EVERY 4 HOURS PRN
Qty: 90 TABLET | Refills: 1 | Status: SHIPPED | OUTPATIENT
Start: 2020-01-30

## 2020-01-30 RX ORDER — TRAMADOL HYDROCHLORIDE 50 MG/1
50 TABLET ORAL EVERY 6 HOURS PRN
Qty: 30 TABLET | Refills: 3 | Status: SHIPPED | OUTPATIENT
Start: 2020-01-30 | End: 2020-08-23

## 2020-01-30 RX ORDER — CLONAZEPAM 0.5 MG/1
0.5 TABLET ORAL 2 TIMES DAILY
Qty: 180 TABLET | Refills: 0 | Status: SHIPPED | OUTPATIENT
Start: 2020-01-30 | End: 2020-08-23

## 2020-01-30 RX ORDER — TRAZODONE HYDROCHLORIDE 50 MG/1
75 TABLET ORAL
Qty: 135 TABLET | Refills: 0 | Status: SHIPPED | OUTPATIENT
Start: 2020-01-30 | End: 2020-08-21 | Stop reason: SDUPTHER

## 2020-01-30 NOTE — ASSESSMENT & PLAN NOTE
Please advise    Mom is touching base, is not having much luck getting appt for psychiatry. She is wanting to know who you would recommend as she is not getting any where on her own    Thank you   Symptoms under good control  Depression controlled  No flare-ups

## 2020-01-30 NOTE — PROGRESS NOTES
BMI Counseling: Body mass index is 25 04 kg/m²  The BMI is above normal  Nutrition recommendations include reducing portion sizes  BMI Counseling: Body mass index is 25 04 kg/m²  The BMI is above normal  Nutrition recommendations include decreasing portion sizes  Exercise recommendations include moderate physical activity 150 minutes/week  Depression Screening and Follow-up Plan: Patient's depression screening was positive with a PHQ-2 score of 0  Clincally patient does not have depression  No treatment is required

## 2020-01-30 NOTE — PROGRESS NOTES
Assessment/Plan:    Attention deficit disorder (ADD) without hyperactivity  Symptoms currently under good control  Recommend recheck in office again in 6 months  Primary hypothyroidism  Recommend recheck TSH  Await results  Depression  Symptoms under good control  Depression controlled  No flare-ups  Diagnoses and all orders for this visit:    Primary hypothyroidism  -     Cancel: TSH, 3rd generation with Free T4 reflex  -     TSH, 3rd generation with Free T4 reflex    Chronic migraine without aura without status migrainosus, not intractable  Comments:  No increased frequency or severity of headaches  Continue current medication as needed  Orders:  -     butalbital-aspirin-caffeine (BUTALBITAL COMPOUND/ASA) -40 MG per tablet; Take 1 tablet by mouth every 4 (four) hours as needed for headaches  -     traMADol (ULTRAM) 50 mg tablet; Take 1 tablet (50 mg total) by mouth every 6 (six) hours as needed for moderate pain    Recurrent major depressive disorder, in full remission (HCC)  -     clonazePAM (KlonoPIN) 0 5 mg tablet; Take 1 tablet (0 5 mg total) by mouth 2 (two) times a day  -     lisdexamfetamine (VYVANSE) 70 MG capsule; Take 1 capsule (70 mg total) by mouth every morningMax Daily Amount: 70 mg  -     traZODone (DESYREL) 50 mg tablet; Take 1 5 tablets (75 mg total) by mouth daily at bedtime    Menopause  -     estradiol (VIVELLE-DOT) 0 05 MG/24HR; Place 1 patch on the skin once a week    Attention deficit disorder (ADD) without hyperactivity          Subjective:      Patient ID: Jam Knapp is a 63 y o  female  Patient is here for evaluation of chronic medical conditions  She is generally feeling well  Depression and anxiety and attention deficit symptoms are under good control  She has occasional migraines and medications help  She has history of hypothyroidism and is due for thyroid recheck  She is due to follow up with gynecologist for regular check    Patient is up-to-date with mammogram and colonoscopy for screenings  She is remaining very active  The following portions of the patient's history were reviewed and updated as appropriate: allergies, current medications, past family history, past medical history, past social history, past surgical history and problem list     Review of Systems   Constitutional: Negative  HENT: Negative  Eyes: Negative  Respiratory: Negative  Cardiovascular: Negative  Gastrointestinal: Negative  Endocrine: Negative  Genitourinary: Negative  Musculoskeletal: Negative  Skin: Negative  Allergic/Immunologic: Negative  Neurological: Negative  Hematological: Negative  Psychiatric/Behavioral: Negative  Negative for agitation, behavioral problems, confusion, decreased concentration, dysphoric mood, hallucinations, self-injury, sleep disturbance and suicidal ideas  The patient is not nervous/anxious and is not hyperactive  Objective:      /78 (BP Location: Left arm, Patient Position: Sitting, Cuff Size: Standard)   Pulse 88   Temp 97 8 °F (36 6 °C) (Tympanic)   Ht 5' 2 7" (1 593 m)   Wt 63 5 kg (140 lb)   LMP 04/25/2016   SpO2 98%   BMI 25 04 kg/m²          Physical Exam   Constitutional: She is oriented to person, place, and time  She appears well-developed and well-nourished  HENT:   Head: Normocephalic and atraumatic  Right Ear: External ear normal  Tympanic membrane is not erythematous and not bulging  Left Ear: External ear normal  Tympanic membrane is not erythematous and not bulging  Nose: Nose normal    Mouth/Throat: Oropharynx is clear and moist and mucous membranes are normal  No oral lesions  No oropharyngeal exudate  Eyes: Conjunctivae and EOM are normal  Right eye exhibits no discharge  Left eye exhibits no discharge  No scleral icterus  Neck: Normal range of motion  Neck supple  No thyromegaly present  Cardiovascular: Normal rate, regular rhythm and normal heart sounds  Exam reveals no gallop and no friction rub  No murmur heard  Pulmonary/Chest: Effort normal  No respiratory distress  She has no wheezes  She has no rales  She exhibits no tenderness  Abdominal: Soft  Bowel sounds are normal  She exhibits no distension and no mass  There is no tenderness  There is no rebound and no guarding  Musculoskeletal: Normal range of motion  She exhibits no edema, tenderness or deformity  Lymphadenopathy:     She has no cervical adenopathy  Neurological: She is alert and oriented to person, place, and time  She has normal reflexes  No cranial nerve deficit  She exhibits normal muscle tone  Coordination normal    Skin: Skin is warm and dry  No rash noted  No erythema  No pallor  Psychiatric: She has a normal mood and affect  Her behavior is normal    Vitals reviewed

## 2020-01-31 LAB — TSH SERPL DL<=0.005 MIU/L-ACNC: 1.1 UIU/ML (ref 0.45–4.5)

## 2020-02-03 ENCOUNTER — TELEPHONE (OUTPATIENT)
Dept: FAMILY MEDICINE CLINIC | Facility: CLINIC | Age: 60
End: 2020-02-03

## 2020-02-03 NOTE — TELEPHONE ENCOUNTER
Thomas Null called and left message on refill line that she was in last week and her Rx for Vyvanse, 70 mg was sent to [facility] in [place]  That pharmacy states they will not have this medication available for 2-3 weeks    Thomas Null would like you to send a new script to [facility] on [address]    Please advise  Thank you

## 2020-02-04 ENCOUNTER — TELEPHONE (OUTPATIENT)
Dept: FAMILY MEDICINE CLINIC | Facility: CLINIC | Age: 60
End: 2020-02-04

## 2020-02-04 DIAGNOSIS — F33.42 RECURRENT MAJOR DEPRESSIVE DISORDER, IN FULL REMISSION (HCC): ICD-10-CM

## 2020-03-10 DIAGNOSIS — F33.42 RECURRENT MAJOR DEPRESSIVE DISORDER, IN FULL REMISSION (HCC): ICD-10-CM

## 2020-03-16 ENCOUNTER — TELEPHONE (OUTPATIENT)
Dept: FAMILY MEDICINE CLINIC | Facility: CLINIC | Age: 60
End: 2020-03-16

## 2020-03-16 NOTE — TELEPHONE ENCOUNTER
Phone call to pt  Unable to leave message on vm  If pt calls back please let her know that Prior was started for her Vyvgeorgiae

## 2020-03-27 ENCOUNTER — TELEMEDICINE (OUTPATIENT)
Dept: FAMILY MEDICINE CLINIC | Facility: CLINIC | Age: 60
End: 2020-03-27
Payer: COMMERCIAL

## 2020-03-27 DIAGNOSIS — Z20.828 EXPOSURE TO SARS-ASSOCIATED CORONAVIRUS: ICD-10-CM

## 2020-03-27 DIAGNOSIS — Z20.828 EXPOSURE TO SARS-ASSOCIATED CORONAVIRUS: Primary | ICD-10-CM

## 2020-03-27 DIAGNOSIS — J06.9 UPPER RESPIRATORY TRACT INFECTION, UNSPECIFIED TYPE: ICD-10-CM

## 2020-03-27 PROCEDURE — G2012 BRIEF CHECK IN BY MD/QHP: HCPCS | Performed by: NURSE PRACTITIONER

## 2020-03-27 PROCEDURE — 87635 SARS-COV-2 COVID-19 AMP PRB: CPT

## 2020-03-27 RX ORDER — AZITHROMYCIN 250 MG/1
TABLET, FILM COATED ORAL
Qty: 6 TABLET | Refills: 0 | Status: SHIPPED | OUTPATIENT
Start: 2020-03-27 | End: 2020-04-01

## 2020-03-27 NOTE — PROGRESS NOTES
COVID-19 Virtual Visit     This virtual check-in was done via telephone  Encounter provider SERG Patrick    Provider located at [facility]    Recent Visits  No visits were found meeting these conditions  Showing recent visits within past 7 days and meeting all other requirements     Today's Visits  Date Type Provider Dept   03/27/20 [address], CRNP [facility]   Showing today's visits and meeting all other requirements     Future Appointments  Date Type Provider Dept   03/27/20 Telemedicine Mirnabenny Newton, CRNP [facility]   Showing future appointments within next 150 days and meeting all other requirements        Patient agrees to participate in a virtual check in via telephone or video visit instead of presenting to the office to address urgent/immediate medical needs  Patient is aware this is a billable service  After connecting through [employer], the patient was identified by name and date of birth  Jam Knapp was informed that this was a telemedicine visit and that the exam was being conducted confidentially over secure lines  My office door was closed  No one else was in the room  Jam Knapp acknowledged consent and understanding of privacy and security of the telemedicine visit  I informed the patient that I have reviewed her record in Epic and presented the opportunity for her to ask any questions regarding the visit today  The patient agreed to participate  Jam Knapp is a 63 y o  female who is concerned about COVID-19  She reports fever and cough  She has not traveled outside the [place]  within the last 14 days    She has had contact with a person who is under investigation for or who is positive for COVID-19 within the last 14 days  She has not been hospitalized recently for fever and/or lower respiratory symptoms      Past Medical History: Diagnosis Date    Allergic rhinitis     last assessed 03/20/2014    Anxiety     Contact dermatitis     last assessed 09/16/2016    Dehydration     last assessed 02/07/2013    Depression     Fibromyalgia     Hx of migraine headaches     Hypothyroid     Leiomyoma of uterus     Low grade squamous intraepith lesion on cytologic smear cervix (lgsil)     last assessed 11/16/2015    Nausea     PONV (postoperative nausea and vomiting)     Sleep disturbance     Symptomatic menopausal or female climacteric states     last assessed 10/26/2015    Uterine fibroid        Past Surgical History:   Procedure Laterality Date    BREAST BIOPSY Left benign  20yrs ago    HYSTERECTOMY      LAPAROTOMY N/A 8/15/2016    Procedure: MINI LAPAROTOMY FOR MYOMA REMOVAL ;  Surgeon: Delphine Roy DO;  [facility];  Service:     NASAL SEPTUM SURGERY      [place] LAP,VAG HYST,UTERUS 250GMS/< N/A 8/15/2016    Procedure: LAPAROSCOPIC ASSISTED VAGINAL HYSTERECTOMY ;  Surgeon: Delphine Roy DO;  [facility];  Service: Gynecology    TONSILLECTOMY      TUBAL LIGATION         Current Outpatient Medications   Medication Sig Dispense Refill    azithromycin (Zithromax) 250 mg tablet Take 2 tablets (500 mg total) by mouth daily for 1 day, THEN 1 tablet (250 mg total) daily for 4 days   6 tablet 0    butalbital-aspirin-caffeine (BUTALBITAL COMPOUND/ASA) -40 MG per tablet Take 1 tablet by mouth every 4 (four) hours as needed for headaches 90 tablet 1    clonazePAM (KlonoPIN) 0 5 mg tablet Take 1 tablet (0 5 mg total) by mouth 2 (two) times a day 180 tablet 0    estradiol (VIVELLE-DOT) 0 05 MG/24HR Place 1 patch on the skin once a week 24 patch 3    fluticasone (FLONASE) 50 mcg/act nasal spray 2 sprays into each nostril daily      lisdexamfetamine (Vyvanse) 70 MG capsule Take 1 capsule (70 mg total) by mouth every morningMax Daily Amount: 70 mg 31 capsule 0    melatonin 1 mg Take 6 mg by mouth daily at bedtime  SYNTHROID 88 MCG tablet Take 1 tablet (88 mcg total) by mouth daily 90 tablet 2    traMADol (ULTRAM) 50 mg tablet Take 1 tablet (50 mg total) by mouth every 6 (six) hours as needed for moderate pain 30 tablet 3    traZODone (DESYREL) 50 mg tablet Take 1 5 tablets (75 mg total) by mouth daily at bedtime 135 tablet 0     No current facility-administered medications for this visit  No Known Allergies    Video Exam    Radha Prince appears alert, no distress, cooperative  Disposition:      I referred Radha Prince to one of our centralized sites for a COVID-19 swab  Because patient has had nasal congestion, cough and weakness since 03/16/2020 I recommended she start Zithromax if not better in 2 days or any worsening  She did have a fever of 101 for 3 days after exposure around 3/16/20 to a   hospitalized patient with coated 19  She is an travel RN    I spent 15 minutes with the patient during this virtual check-in visit

## 2020-03-27 NOTE — PROGRESS NOTES
COVID-19 Virtual Visit     This virtual check-in was done via [employer] and patient was informed that this is not a secure, HIPAA-complaint platform  she agrees to proceed     Encounter provider SERG Champion    Provider located at [address] [place] [address]    Recent Visits  No visits were found meeting these conditions  Showing recent visits within past 7 days and meeting all other requirements     Today's Visits  Date Type Provider Dept   03/27/20 [address] [facility], CRNP [facility]   Showing today's visits and meeting all other requirements     Future Appointments  Date Type Provider Dept   03/27/20 Telemedicine Tiffanie Ana Newton, CRNP [facility]   Showing future appointments within next 150 days and meeting all other requirements        Patient agrees to participate in a virtual check in via telephone or video visit instead of presenting to the office to address urgent/immediate medical needs  Patient is aware this is a billable service  After connecting through telephone, the patient was identified by name and date of birth  Marta Giles was informed that this was a telemedicine visit and that the exam was being conducted confidentially over secure lines  My office door was closed  No one else was in the room  Marta Giles acknowledged consent and understanding of privacy and security of the telemedicine visit  I informed the patient that I have reviewed her record in Epic and presented the opportunity for her to ask any questions regarding the visit today  The patient agreed to participate  Marta Giles is a 63 y o  female who is concerned about COVID-19  She reports fever, cough and nasal congestion and weakness  She has not traveled outside the [place]  within the last 14 days    She has had contact with a person who is under investigation for or who is positive for COVID-19 within the last 14 days  She has not been hospitalized recently for fever and/or lower respiratory symptoms      Past Medical History:   Diagnosis Date    Allergic rhinitis     last assessed 03/20/2014    Anxiety     Contact dermatitis     last assessed 09/16/2016    Dehydration     last assessed 02/07/2013    Depression     Fibromyalgia     Hx of migraine headaches     Hypothyroid     Leiomyoma of uterus     Low grade squamous intraepith lesion on cytologic smear cervix (lgsil)     last assessed 11/16/2015    Nausea     PONV (postoperative nausea and vomiting)     Sleep disturbance     Symptomatic menopausal or female climacteric states     last assessed 10/26/2015    Uterine fibroid        Past Surgical History:   Procedure Laterality Date    BREAST BIOPSY Left benign  20yrs ago    HYSTERECTOMY      LAPAROTOMY N/A 8/15/2016    Procedure: MINI LAPAROTOMY FOR MYOMA REMOVAL ;  Surgeon: Judi Vera DO;  [facility];  Service:     NASAL SEPTUM SURGERY      SC LAP,VAG HYST,UTERUS 250GMS/< N/A 8/15/2016    Procedure: LAPAROSCOPIC ASSISTED VAGINAL HYSTERECTOMY ;  Surgeon: Judi Vera DO;  [facility];  Service: Gynecology    TONSILLECTOMY      TUBAL LIGATION         Current Outpatient Medications   Medication Sig Dispense Refill    butalbital-aspirin-caffeine (BUTALBITAL COMPOUND/ASA) -40 MG per tablet Take 1 tablet by mouth every 4 (four) hours as needed for headaches 90 tablet 1    clonazePAM (KlonoPIN) 0 5 mg tablet Take 1 tablet (0 5 mg total) by mouth 2 (two) times a day 180 tablet 0    estradiol (VIVELLE-DOT) 0 05 MG/24HR Place 1 patch on the skin once a week 24 patch 3    fluticasone (FLONASE) 50 mcg/act nasal spray 2 sprays into each nostril daily      lisdexamfetamine (Vyvanse) 70 MG capsule Take 1 capsule (70 mg total) by mouth every morningMax Daily Amount: 70 mg 31 capsule 0    melatonin 1 mg Take 6 mg by mouth daily at bedtime        SYNTHROID 88 MCG tablet Take 1 tablet (88 mcg total) by mouth daily 90 tablet 2    traMADol (ULTRAM) 50 mg tablet Take 1 tablet (50 mg total) by mouth every 6 (six) hours as needed for moderate pain 30 tablet 3    traZODone (DESYREL) 50 mg tablet Take 1 5 tablets (75 mg total) by mouth daily at bedtime 135 tablet 0     No current facility-administered medications for this visit  No Known Allergies    Video Exam    David Lozoya appears alert, no distress, cooperative, naso[phonic  Disposition:      I referred David Lozoya to one of our centralized sites for a COVID-19 swab  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  Because patient has had nasal congestion, cough and weakness since 03/16/2020, I will put in a prescription for Z-Toan  Since she is improving today she will wait 2 more days and if not better or any worsening she will start the Z-Toan  Follow-up if worsening or no improvement in 3-5 days  Discussed that she must stay in quarantine until after her covid results are in  I spent 15 minutes with the patient during this virtual check-in visit  Virtual Regular Visit    Problem List Items Addressed This Visit     None               Reason for visit is ***    Encounter provider SERG Lira    Provider located at [address] [place] [address] [phone]      Recent Visits  No visits were found meeting these conditions     Showing recent visits within past 7 days and meeting all other requirements     Today's Visits  Date Type Provider Dept   03/27/20 [address] [facility], CRNP [facility]   Showing today's visits and meeting all other requirements     Future Appointments  Date Type Provider Dept   03/27/20 Telemedicine SERG James [facility]   Showing future appointments within next 150 days and meeting all other requirements        After connecting through [employer], the patient was identified by name and date of birth  Roselyn Vela was informed that this is a telemedicine visit and that the visit is being conducted through {AMB CORONAVIRUS VISIT GWUEYB:99605} which may not be secure and therefore, might not be HIPAA-compliant  {Telemedicine confidentiality :02074} {Telemedicine participants:75617}  She acknowledged consent and understanding of privacy and security of the video platform  The patient has agreed to participate and understands they can discontinue the visit at any time  Subjective  Roselyn Vela is a 63 y o  female ***        Past Medical History:   Diagnosis Date    Allergic rhinitis     last assessed 03/20/2014    Anxiety     Contact dermatitis     last assessed 09/16/2016    Dehydration     last assessed 02/07/2013    Depression     Fibromyalgia     Hx of migraine headaches     Hypothyroid     Leiomyoma of uterus     Low grade squamous intraepith lesion on cytologic smear cervix (lgsil)     last assessed 11/16/2015    Nausea     PONV (postoperative nausea and vomiting)     Sleep disturbance     Symptomatic menopausal or female climacteric states     last assessed 10/26/2015    Uterine fibroid        Past Surgical History:   Procedure Laterality Date    BREAST BIOPSY Left benign  20yrs ago    HYSTERECTOMY      LAPAROTOMY N/A 8/15/2016    Procedure: MINI LAPAROTOMY FOR MYOMA REMOVAL ;  Surgeon: Divya Brown DO;  [facility];  Service:     NASAL SEPTUM SURGERY      SC LAP,VAG HYST,UTERUS 250GMS/< N/A 8/15/2016    Procedure: LAPAROSCOPIC ASSISTED VAGINAL HYSTERECTOMY ;  Surgeon: Divya Brown DO;  [facility];  Service: Gynecology    TONSILLECTOMY      TUBAL LIGATION         Current Outpatient Medications   Medication Sig Dispense Refill    butalbital-aspirin-caffeine (BUTALBITAL COMPOUND/ASA) -40 MG per tablet Take 1 tablet by mouth every 4 (four) hours as needed for headaches 90 tablet 1    clonazePAM (KlonoPIN) 0 5 mg tablet Take 1 tablet (0 5 mg total) by mouth 2 (two) times a day 180 tablet 0    estradiol (VIVELLE-DOT) 0 05 MG/24HR Place 1 patch on the skin once a week 24 patch 3    fluticasone (FLONASE) 50 mcg/act nasal spray 2 sprays into each nostril daily      lisdexamfetamine (Vyvanse) 70 MG capsule Take 1 capsule (70 mg total) by mouth every morningMax Daily Amount: 70 mg 31 capsule 0    melatonin 1 mg Take 6 mg by mouth daily at bedtime        SYNTHROID 88 MCG tablet Take 1 tablet (88 mcg total) by mouth daily 90 tablet 2    traMADol (ULTRAM) 50 mg tablet Take 1 tablet (50 mg total) by mouth every 6 (six) hours as needed for moderate pain 30 tablet 3    traZODone (DESYREL) 50 mg tablet Take 1 5 tablets (75 mg total) by mouth daily at bedtime 135 tablet 0     No current facility-administered medications for this visit  No Known Allergies    Review of Systems      I spent *** minutes with the patient during this visit

## 2020-04-01 LAB — SARS-COV-2 RNA SPEC QL NAA+PROBE: NOT DETECTED

## 2020-04-16 ENCOUNTER — TELEPHONE (OUTPATIENT)
Dept: FAMILY MEDICINE CLINIC | Facility: CLINIC | Age: 60
End: 2020-04-16

## 2020-04-16 DIAGNOSIS — Z78.0 MENOPAUSE: ICD-10-CM

## 2020-04-16 RX ORDER — ESTRADIOL 0.05 MG/D
1 FILM, EXTENDED RELEASE TRANSDERMAL 2 TIMES WEEKLY
Qty: 24 PATCH | Refills: 2 | Status: SHIPPED | OUTPATIENT
Start: 2020-04-16 | End: 2020-04-30

## 2020-04-17 ENCOUNTER — TELEPHONE (OUTPATIENT)
Dept: FAMILY MEDICINE CLINIC | Facility: CLINIC | Age: 60
End: 2020-04-17

## 2020-04-24 DIAGNOSIS — F33.42 RECURRENT MAJOR DEPRESSIVE DISORDER, IN FULL REMISSION (HCC): ICD-10-CM

## 2020-04-28 ENCOUNTER — TELEPHONE (OUTPATIENT)
Dept: FAMILY MEDICINE CLINIC | Facility: CLINIC | Age: 60
End: 2020-04-28

## 2020-04-30 DIAGNOSIS — Z78.0 MENOPAUSE: Primary | ICD-10-CM

## 2020-04-30 RX ORDER — ESTRADIOL 0.05 MG/D
1 PATCH TRANSDERMAL WEEKLY
Qty: 12 PATCH | Refills: 3 | Status: SHIPPED | OUTPATIENT
Start: 2020-04-30 | End: 2021-03-09

## 2020-05-28 DIAGNOSIS — F33.42 RECURRENT MAJOR DEPRESSIVE DISORDER, IN FULL REMISSION (HCC): ICD-10-CM

## 2020-07-06 DIAGNOSIS — F33.42 RECURRENT MAJOR DEPRESSIVE DISORDER, IN FULL REMISSION (HCC): ICD-10-CM

## 2020-07-14 DIAGNOSIS — E03.9 PRIMARY HYPOTHYROIDISM: ICD-10-CM

## 2020-07-14 RX ORDER — LEVOTHYROXINE SODIUM 88 MCG
TABLET ORAL
Qty: 90 TABLET | Refills: 2 | Status: SHIPPED | OUTPATIENT
Start: 2020-07-14 | End: 2021-04-13 | Stop reason: SDUPTHER

## 2020-08-17 DIAGNOSIS — F33.42 RECURRENT MAJOR DEPRESSIVE DISORDER, IN FULL REMISSION (HCC): ICD-10-CM

## 2020-08-17 RX ORDER — TRAZODONE HYDROCHLORIDE 50 MG/1
75 TABLET ORAL
Qty: 135 TABLET | Refills: 0 | OUTPATIENT
Start: 2020-08-17 | End: 2020-11-15

## 2020-08-21 DIAGNOSIS — F33.42 RECURRENT MAJOR DEPRESSIVE DISORDER, IN FULL REMISSION (HCC): ICD-10-CM

## 2020-08-21 RX ORDER — TRAZODONE HYDROCHLORIDE 50 MG/1
75 TABLET ORAL
Qty: 135 TABLET | Refills: 0 | Status: SHIPPED | OUTPATIENT
Start: 2020-08-21 | End: 2020-10-20 | Stop reason: SDUPTHER

## 2020-08-23 DIAGNOSIS — G43.709 CHRONIC MIGRAINE WITHOUT AURA WITHOUT STATUS MIGRAINOSUS, NOT INTRACTABLE: ICD-10-CM

## 2020-08-23 DIAGNOSIS — F33.42 RECURRENT MAJOR DEPRESSIVE DISORDER, IN FULL REMISSION (HCC): ICD-10-CM

## 2020-08-23 RX ORDER — TRAMADOL HYDROCHLORIDE 50 MG/1
50 TABLET ORAL EVERY 6 HOURS PRN
Qty: 30 TABLET | Refills: 0 | Status: SHIPPED | OUTPATIENT
Start: 2020-08-23 | End: 2020-11-20 | Stop reason: SDUPTHER

## 2020-08-23 RX ORDER — CLONAZEPAM 0.5 MG/1
0.5 TABLET ORAL 2 TIMES DAILY
Qty: 180 TABLET | Refills: 0 | Status: SHIPPED | OUTPATIENT
Start: 2020-08-23 | End: 2020-10-20 | Stop reason: SDUPTHER

## 2020-10-13 DIAGNOSIS — F33.42 RECURRENT MAJOR DEPRESSIVE DISORDER, IN FULL REMISSION (HCC): ICD-10-CM

## 2020-10-20 ENCOUNTER — OFFICE VISIT (OUTPATIENT)
Dept: FAMILY MEDICINE CLINIC | Facility: CLINIC | Age: 60
End: 2020-10-20
Payer: COMMERCIAL

## 2020-10-20 DIAGNOSIS — Z12.39 ENCOUNTER FOR SCREENING FOR MALIGNANT NEOPLASM OF BREAST, UNSPECIFIED SCREENING MODALITY: ICD-10-CM

## 2020-10-20 DIAGNOSIS — F33.42 RECURRENT MAJOR DEPRESSIVE DISORDER, IN FULL REMISSION (HCC): ICD-10-CM

## 2020-10-20 DIAGNOSIS — E03.9 PRIMARY HYPOTHYROIDISM: Primary | ICD-10-CM

## 2020-10-20 DIAGNOSIS — F98.8 ATTENTION DEFICIT DISORDER (ADD) WITHOUT HYPERACTIVITY: ICD-10-CM

## 2020-10-20 PROCEDURE — 1036F TOBACCO NON-USER: CPT | Performed by: FAMILY MEDICINE

## 2020-10-20 PROCEDURE — 99213 OFFICE O/P EST LOW 20 MIN: CPT | Performed by: FAMILY MEDICINE

## 2020-10-20 RX ORDER — TRAZODONE HYDROCHLORIDE 50 MG/1
75 TABLET ORAL
Qty: 135 TABLET | Refills: 0 | Status: SHIPPED | OUTPATIENT
Start: 2020-10-20 | End: 2021-03-08 | Stop reason: SDUPTHER

## 2020-10-20 RX ORDER — CLONAZEPAM 0.5 MG/1
0.5 TABLET ORAL 2 TIMES DAILY
Qty: 180 TABLET | Refills: 0 | Status: SHIPPED | OUTPATIENT
Start: 2020-10-20 | End: 2021-03-08 | Stop reason: SDUPTHER

## 2020-11-08 DIAGNOSIS — G43.709 CHRONIC MIGRAINE WITHOUT AURA WITHOUT STATUS MIGRAINOSUS, NOT INTRACTABLE: ICD-10-CM

## 2020-11-11 RX ORDER — TRAMADOL HYDROCHLORIDE 50 MG/1
50 TABLET ORAL EVERY 6 HOURS PRN
Qty: 30 TABLET | Refills: 0 | OUTPATIENT
Start: 2020-11-11

## 2020-11-20 DIAGNOSIS — G43.709 CHRONIC MIGRAINE WITHOUT AURA WITHOUT STATUS MIGRAINOSUS, NOT INTRACTABLE: ICD-10-CM

## 2020-11-24 RX ORDER — TRAMADOL HYDROCHLORIDE 50 MG/1
50 TABLET ORAL EVERY 6 HOURS PRN
Qty: 30 TABLET | Refills: 0 | Status: SHIPPED | OUTPATIENT
Start: 2020-11-24 | End: 2021-04-13 | Stop reason: SDUPTHER

## 2020-11-30 DIAGNOSIS — F33.42 RECURRENT MAJOR DEPRESSIVE DISORDER, IN FULL REMISSION (HCC): ICD-10-CM

## 2021-01-11 DIAGNOSIS — F33.42 RECURRENT MAJOR DEPRESSIVE DISORDER, IN FULL REMISSION (HCC): ICD-10-CM

## 2021-02-03 ENCOUNTER — HOSPITAL ENCOUNTER (OUTPATIENT)
Dept: MAMMOGRAPHY | Facility: HOSPITAL | Age: 61
Discharge: HOME | End: 2021-02-03
Payer: COMMERCIAL

## 2021-02-03 VITALS — WEIGHT: 140 LBS | BODY MASS INDEX: 24.8 KG/M2 | HEIGHT: 63 IN

## 2021-02-03 DIAGNOSIS — Z12.39 ENCOUNTER FOR SCREENING FOR MALIGNANT NEOPLASM OF BREAST, UNSPECIFIED SCREENING MODALITY: ICD-10-CM

## 2021-02-03 PROCEDURE — 77063 BREAST TOMOSYNTHESIS BI: CPT

## 2021-02-03 PROCEDURE — 77067 SCR MAMMO BI INCL CAD: CPT

## 2021-02-19 DIAGNOSIS — F33.42 RECURRENT MAJOR DEPRESSIVE DISORDER, IN FULL REMISSION (HCC): ICD-10-CM

## 2021-03-08 DIAGNOSIS — Z78.0 MENOPAUSE: ICD-10-CM

## 2021-03-08 DIAGNOSIS — F33.42 RECURRENT MAJOR DEPRESSIVE DISORDER, IN FULL REMISSION (HCC): ICD-10-CM

## 2021-03-08 RX ORDER — CLONAZEPAM 0.5 MG/1
0.5 TABLET ORAL 2 TIMES DAILY
Qty: 180 TABLET | Refills: 0 | Status: SHIPPED | OUTPATIENT
Start: 2021-03-08 | End: 2021-06-15 | Stop reason: SDUPTHER

## 2021-03-08 RX ORDER — TRAZODONE HYDROCHLORIDE 50 MG/1
75 TABLET ORAL
Qty: 135 TABLET | Refills: 0 | Status: SHIPPED | OUTPATIENT
Start: 2021-03-08 | End: 2021-06-15 | Stop reason: SDUPTHER

## 2021-03-09 RX ORDER — ESTRADIOL 0.05 MG/D
PATCH TRANSDERMAL
Qty: 12 PATCH | Refills: 3 | Status: SHIPPED | OUTPATIENT
Start: 2021-03-09 | End: 2022-01-02

## 2021-03-31 DIAGNOSIS — Z23 ENCOUNTER FOR IMMUNIZATION: ICD-10-CM

## 2021-04-02 DIAGNOSIS — F33.42 RECURRENT MAJOR DEPRESSIVE DISORDER, IN FULL REMISSION (HCC): ICD-10-CM

## 2021-04-09 ENCOUNTER — TELEMEDICINE (OUTPATIENT)
Dept: FAMILY MEDICINE CLINIC | Facility: CLINIC | Age: 61
End: 2021-04-09
Payer: COMMERCIAL

## 2021-04-09 DIAGNOSIS — Z11.59 SCREENING FOR VIRAL DISEASE: Primary | ICD-10-CM

## 2021-04-09 PROCEDURE — 99213 OFFICE O/P EST LOW 20 MIN: CPT | Performed by: FAMILY MEDICINE

## 2021-04-09 NOTE — PROGRESS NOTES
Virtual Regular Visit      Assessment/Plan:  Recommend COVID testing  Recommend good hydration over the coming days  She will call with any new persisting or worsening symptoms  Problem List Items Addressed This Visit     None      Visit Diagnoses     Screening for viral disease    -  Primary    Relevant Orders    Novel Coronavirus (Covid-19),PCR [facility] - Collected at Mobile [facility] or [facility]               Reason for visit is   Chief Complaint   Patient presents with    Virtual Regular Visit        Encounter provider Amrik Newberry DO    Provider located at [address] [place] [address] [phone]      Recent Visits  No visits were found meeting these conditions  Showing recent visits within past 7 days and meeting all other requirements     Today's Visits  Date Type Provider Dept   04/09/21 Telemedicine Amrik Newberry DO [facility] [place]   Showing today's visits and meeting all other requirements     Future Appointments  No visits were found meeting these conditions  Showing future appointments within next 150 days and meeting all other requirements        The patient was identified by name and date of birth  Genevieve Cole was informed that this is a telemedicine visit and that the visit is being conducted through [address] [place] [address] and patient was informed that this is not a secure, HIPAA-compliant platform  She agrees to proceed     My office door was closed  No one else was in the room  She acknowledged consent and understanding of privacy and security of the video platform  The patient has agreed to participate and understands they can discontinue the visit at any time  Patient is aware this is a billable service  Subjective  Genevieve Cole is a 62 y o  female   For diarrhea as noted   Patient with diarrhea and stomach upset for 24 hours with low-grade fever and fatigue  Symptoms are mild  Denies any other symptoms    She has not had a COVID vaccine         Past Medical History:   Diagnosis Date    Allergic rhinitis     last assessed 03/20/2014    Anxiety     Contact dermatitis     last assessed 09/16/2016    Dehydration     last assessed 02/07/2013    Depression     Fibromyalgia     Hx of migraine headaches     Hypothyroid     Leiomyoma of uterus     Low grade squamous intraepith lesion on cytologic smear cervix (lgsil)     last assessed 11/16/2015    Nausea     PONV (postoperative nausea and vomiting)     Sleep disturbance     Symptomatic menopausal or female climacteric states     last assessed 10/26/2015    Uterine fibroid        Past Surgical History:   Procedure Laterality Date    BREAST BIOPSY Left benign  20yrs ago    HYSTERECTOMY      LAPAROTOMY N/A 8/15/2016    Procedure: MINI LAPAROTOMY FOR MYOMA REMOVAL ;  Surgeon: Shaggy Thompson DO;  [facility];  Service:     NASAL SEPTUM SURGERY      [place] LAP,VAG HYST,UTERUS 250GMS/< N/A 8/15/2016    Procedure: LAPAROSCOPIC ASSISTED VAGINAL HYSTERECTOMY ;  Surgeon: Shaggy Thompson DO;  [facility];  Service: Gynecology    TONSILLECTOMY      TUBAL LIGATION         Current Outpatient Medications   Medication Sig Dispense Refill    butalbital-aspirin-caffeine (BUTALBITAL COMPOUND/ASA) -40 MG per tablet Take 1 tablet by mouth every 4 (four) hours as needed for headaches 90 tablet 1    clonazePAM (KlonoPIN) 0 5 mg tablet Take 1 tablet (0 5 mg total) by mouth 2 (two) times a day 180 tablet 0    estradiol (CLIMARA) 0 05 mg/24 hr APPLY 1 PATCH TOPICALLY  ONCE A WEEK 12 patch 3    fluticasone (FLONASE) 50 mcg/act nasal spray 2 sprays into each nostril daily      lisdexamfetamine (Vyvanse) 70 MG capsule Take 1 capsule (70 mg total) by mouth every morningMax Daily Amount: 70 mg 31 capsule 0    melatonin 1 mg Take 6 mg by mouth daily at bedtime        SYNTHROID 88 MCG tablet TAKE 1 TABLET BY MOUTH EVERY DAY 90 tablet 2    traMADol (ULTRAM) 50 mg tablet Take 1 tablet (50 mg total) by mouth every 6 (six) hours as needed for moderate pain 30 tablet 0    traZODone (DESYREL) 50 mg tablet Take 1 5 tablets (75 mg total) by mouth daily at bedtime 135 tablet 0     No current facility-administered medications for this visit  No Known Allergies    Review of Systems   Constitutional: Negative  HENT: Negative  Eyes: Negative  Respiratory: Negative  Cardiovascular: Negative  Gastrointestinal: Positive for diarrhea  Endocrine: Negative  Genitourinary: Negative  Musculoskeletal: Negative  Skin: Negative  Allergic/Immunologic: Negative  Neurological: Negative  Hematological: Negative  Psychiatric/Behavioral: Negative  Video Exam    There were no vitals filed for this visit  Physical Exam  Constitutional:       General: She is not in acute distress  Appearance: She is well-developed  She is not diaphoretic  Neurological:      Mental Status: She is alert and oriented to person, place, and time  Psychiatric:         Behavior: Behavior normal          Thought Content: Thought content normal          Judgment: Judgment normal           I spent 15 minutes directly with the patient during this visit      VIRTUAL VISIT DISCLAIMER    Glynn Ling acknowledges that she has consented to an online visit or consultation  She understands that the online visit is based solely on information provided by her, and that, in the absence of a face-to-face physical evaluation by the physician, the diagnosis she receives is both limited and provisional in terms of accuracy and completeness  This is not intended to replace a full medical face-to-face evaluation by the physician  Glynn Ling understands and accepts these terms

## 2021-04-10 DIAGNOSIS — Z11.59 SCREENING FOR VIRAL DISEASE: ICD-10-CM

## 2021-04-10 LAB — SARS-COV-2 RNA RESP QL NAA+PROBE: POSITIVE

## 2021-04-10 PROCEDURE — 87635 SARS-COV-2 COVID-19 AMP PRB: CPT | Performed by: FAMILY MEDICINE

## 2021-04-13 DIAGNOSIS — F33.42 RECURRENT MAJOR DEPRESSIVE DISORDER, IN FULL REMISSION (HCC): ICD-10-CM

## 2021-04-13 DIAGNOSIS — E03.9 PRIMARY HYPOTHYROIDISM: ICD-10-CM

## 2021-04-13 DIAGNOSIS — G43.709 CHRONIC MIGRAINE WITHOUT AURA WITHOUT STATUS MIGRAINOSUS, NOT INTRACTABLE: ICD-10-CM

## 2021-04-13 RX ORDER — LEVOTHYROXINE SODIUM 88 MCG
88 TABLET ORAL DAILY
Qty: 90 TABLET | Refills: 2 | Status: SHIPPED | OUTPATIENT
Start: 2021-04-13 | End: 2022-01-01

## 2021-04-13 RX ORDER — TRAMADOL HYDROCHLORIDE 50 MG/1
50 TABLET ORAL EVERY 6 HOURS PRN
Qty: 30 TABLET | Refills: 0 | Status: SHIPPED | OUTPATIENT
Start: 2021-04-13 | End: 2021-07-06 | Stop reason: SDUPTHER

## 2021-04-14 ENCOUNTER — TELEMEDICINE (OUTPATIENT)
Dept: FAMILY MEDICINE CLINIC | Facility: CLINIC | Age: 61
End: 2021-04-14
Payer: COMMERCIAL

## 2021-04-14 DIAGNOSIS — U07.1 COVID-19: Primary | ICD-10-CM

## 2021-04-14 PROCEDURE — 99213 OFFICE O/P EST LOW 20 MIN: CPT | Performed by: FAMILY MEDICINE

## 2021-04-14 NOTE — LETTER
April 14, 2021     Patient: Kathy Lundberg   YOB: 1960   Date of Visit: 4/14/2021       To Whom it May Concern:    Kathy Lundberg is under my professional care  She was seen in my office on 4/14/2021  She may return to work on 04/21/2021  If you have any questions or concerns, please don't hesitate to call           Sincerely,          Hilda Lares, DO        CC: No Recipients

## 2021-04-14 NOTE — LETTER
April 15, 2021     Patient: Raven Feng   YOB: 1960   Date of Visit: 4/14/2021       To Whom it May Concern:    Raven Feng is under my professional care  She was seen in my office on 4/14/2021  She may return to work on 4/21/2021  If you have any questions or concerns, please don't hesitate to call           Sincerely,          Idania Figueroa DO        CC: No Recipients

## 2021-04-14 NOTE — PROGRESS NOTES
Virtual Regular Visit      Assessment/Plan: recommend continued monitoring for any worsening symptoms  She is planning to go back to work in the middle of next week  Recommend good hydration and she will call with any new persisting or worsening symptoms  Problem List Items Addressed This Visit     None      Visit Diagnoses     COVID-19    -  Primary               Reason for visit is No chief complaint on file  Encounter provider Peg Alvarenga DO    Provider located at [address] [place] [address] [phone]      Recent Visits  Date Type Provider Dept   04/09/21 Essence Kothari, DO [facility]   Showing recent visits within past 7 days and meeting all other requirements     Future Appointments  No visits were found meeting these conditions  Showing future appointments within next 150 days and meeting all other requirements        The patient was identified by name and date of birth  Rosa Blount was informed that this is a telemedicine visit and that the visit is being conducted through [address] [place] [address] and patient was informed that this is not a secure, HIPAA-compliant platform  She agrees to proceed     My office door was closed  No one else was in the room  She acknowledged consent and understanding of privacy and security of the video platform  The patient has agreed to participate and understands they can discontinue the visit at any time  Patient is aware this is a billable service  Subjective  Rosa Blount is a 62 y o  female   For COVID-19   Patient seen for video visit for recheck on COVID 19  She is generally feeling a little bit better but still feels fatigued  No significant shortness of breath  She has about 8 days after onset of symptoms period she tested positive on 04/10/2021  She gets occasional loose stools and abdominal cramping         Past Medical History:   Diagnosis Date    Allergic rhinitis last assessed 03/20/2014    Anxiety     Contact dermatitis     last assessed 09/16/2016    Dehydration     last assessed 02/07/2013    Depression     Fibromyalgia     Hx of migraine headaches     Hypothyroid     Leiomyoma of uterus     Low grade squamous intraepith lesion on cytologic smear cervix (lgsil)     last assessed 11/16/2015    Nausea     PONV (postoperative nausea and vomiting)     Sleep disturbance     Symptomatic menopausal or female climacteric states     last assessed 10/26/2015    Uterine fibroid        Past Surgical History:   Procedure Laterality Date    BREAST BIOPSY Left benign  20yrs ago    HYSTERECTOMY      LAPAROTOMY N/A 8/15/2016    Procedure: MINI LAPAROTOMY FOR MYOMA REMOVAL ;  Surgeon: Gabriele Ortiz DO;  [facility];  Service:     NASAL SEPTUM SURGERY      [place] LAP,VAG HYST,UTERUS 250GMS/< N/A 8/15/2016    Procedure: LAPAROSCOPIC ASSISTED VAGINAL HYSTERECTOMY ;  Surgeon: Gabriele Ortiz DO;  [facility];  Service: Gynecology    TONSILLECTOMY      TUBAL LIGATION         Current Outpatient Medications   Medication Sig Dispense Refill    butalbital-aspirin-caffeine (BUTALBITAL COMPOUND/ASA) -40 MG per tablet Take 1 tablet by mouth every 4 (four) hours as needed for headaches 90 tablet 1    clonazePAM (KlonoPIN) 0 5 mg tablet Take 1 tablet (0 5 mg total) by mouth 2 (two) times a day 180 tablet 0    estradiol (CLIMARA) 0 05 mg/24 hr APPLY 1 PATCH TOPICALLY  ONCE A WEEK 12 patch 3    fluticasone (FLONASE) 50 mcg/act nasal spray 2 sprays into each nostril daily      lisdexamfetamine (Vyvanse) 70 MG capsule Take 1 capsule (70 mg total) by mouth every morningMax Daily Amount: 70 mg 31 capsule 0    melatonin 1 mg Take 6 mg by mouth daily at bedtime        Synthroid 88 MCG tablet Take 1 tablet (88 mcg total) by mouth daily 90 tablet 2    traMADol (ULTRAM) 50 mg tablet Take 1 tablet (50 mg total) by mouth every 6 (six) hours as needed for moderate pain 30 tablet 0    traZODone (DESYREL) 50 mg tablet Take 1 5 tablets (75 mg total) by mouth daily at bedtime 135 tablet 0     No current facility-administered medications for this visit  No Known Allergies    Review of Systems   Constitutional: Positive for fatigue  HENT: Negative  Eyes: Negative  Respiratory: Positive for cough  Cardiovascular: Negative  Gastrointestinal: Negative  As noted in HPI   Endocrine: Negative  Genitourinary: Negative  Musculoskeletal: Negative  Skin: Negative  Allergic/Immunologic: Negative  Neurological: Negative  Hematological: Negative  Psychiatric/Behavioral: Negative  Video Exam    There were no vitals filed for this visit  Physical Exam  Constitutional:       General: She is not in acute distress  Appearance: She is well-developed  She is not diaphoretic  Neurological:      Mental Status: She is alert and oriented to person, place, and time  Psychiatric:         Behavior: Behavior normal          Thought Content: Thought content normal          Judgment: Judgment normal           I spent 15 minutes directly with the patient during this visit      VIRTUAL VISIT DISCLAIMER    Glynn Ling acknowledges that she has consented to an online visit or consultation  She understands that the online visit is based solely on information provided by her, and that, in the absence of a face-to-face physical evaluation by the physician, the diagnosis she receives is both limited and provisional in terms of accuracy and completeness  This is not intended to replace a full medical face-to-face evaluation by the physician  Glynn Ling understands and accepts these terms

## 2021-04-19 ENCOUNTER — TELEPHONE (OUTPATIENT)
Dept: FAMILY MEDICINE CLINIC | Facility: CLINIC | Age: 61
End: 2021-04-19

## 2021-04-19 NOTE — TELEPHONE ENCOUNTER
Patient called she states that her prescription insurance [employer] will no longer will cover her Synthroid   The fax number to [employer] is [phone] per your request

## 2021-04-27 ENCOUNTER — TELEMEDICINE (OUTPATIENT)
Dept: FAMILY MEDICINE CLINIC | Facility: CLINIC | Age: 61
End: 2021-04-27
Payer: COMMERCIAL

## 2021-04-27 DIAGNOSIS — U07.1 COVID-19: Primary | ICD-10-CM

## 2021-04-27 PROCEDURE — 1036F TOBACCO NON-USER: CPT | Performed by: FAMILY MEDICINE

## 2021-04-27 PROCEDURE — 99213 OFFICE O/P EST LOW 20 MIN: CPT | Performed by: FAMILY MEDICINE

## 2021-04-27 NOTE — PROGRESS NOTES
Virtual Regular Visit      Assessment/Plan:  Recommend 1 more week home from work  We discussed possible pulmonary or physical therapy rehab  She will call with any new or worsening symptoms  Rehab and physical therapy currently deferred  Recommend office evaluation if symptoms persist     Problem List Items Addressed This Visit        Other    COVID-19 - Primary               Reason for visit is No chief complaint on file  Encounter provider Denys Flores DO    Provider located at [address] [place] [address] [address] [phone]      Recent Visits  No visits were found meeting these conditions  Showing recent visits within past 7 days and meeting all other requirements     Today's Visits  Date Type Provider Dept   04/27/21 Telemedicine Denys Flores DO [facility]   Showing today's visits and meeting all other requirements     Future Appointments  No visits were found meeting these conditions  Showing future appointments within next 150 days and meeting all other requirements        The patient was identified by name and date of birth  Carina Alvarez was informed that this is a telemedicine visit and that the visit is being conducted through [facility] and patient was informed that this is not a secure, HIPAA-compliant platform  She agrees to proceed     My office door was closed  No one else was in the room  She acknowledged consent and understanding of privacy and security of the video platform  The patient has agreed to participate and understands they can discontinue the visit at any time  Patient is aware this is a billable service  Subjective  Carina Alvarez is a 62 y o  female   For COVID brayan   Patient continues with fatigue  She was diagnosed with COVID several weeks ago  She tried going back to work but is feeling increasingly fatigued  No shortness of breath or chest pain  She is not interested in further vaccinations  She denies any fevers  No loss of taste or smell  Appetite is normal   No rashes         Past Medical History:   Diagnosis Date    Allergic rhinitis     last assessed 03/20/2014    Anxiety     Contact dermatitis     last assessed 09/16/2016    Dehydration     last assessed 02/07/2013    Depression     Fibromyalgia     Hx of migraine headaches     Hypothyroid     Leiomyoma of uterus     Low grade squamous intraepith lesion on cytologic smear cervix (lgsil)     last assessed 11/16/2015    Nausea     PONV (postoperative nausea and vomiting)     Sleep disturbance     Symptomatic menopausal or female climacteric states     last assessed 10/26/2015    Uterine fibroid        Past Surgical History:   Procedure Laterality Date    BREAST BIOPSY Left benign  20yrs ago    HYSTERECTOMY      LAPAROTOMY N/A 8/15/2016    Procedure: MINI LAPAROTOMY FOR MYOMA REMOVAL ;  Surgeon: Vickie Dunlap DO;  [facility];  Service:     NASAL SEPTUM SURGERY      [place] LAP,VAG HYST,UTERUS 250GMS/< N/A 8/15/2016    Procedure: LAPAROSCOPIC ASSISTED VAGINAL HYSTERECTOMY ;  Surgeon: Vickie Dunlap DO;  [facility];  Service: Gynecology    TONSILLECTOMY      TUBAL LIGATION         Current Outpatient Medications   Medication Sig Dispense Refill    butalbital-aspirin-caffeine (BUTALBITAL COMPOUND/ASA) -40 MG per tablet Take 1 tablet by mouth every 4 (four) hours as needed for headaches 90 tablet 1    clonazePAM (KlonoPIN) 0 5 mg tablet Take 1 tablet (0 5 mg total) by mouth 2 (two) times a day 180 tablet 0    estradiol (CLIMARA) 0 05 mg/24 hr APPLY 1 PATCH TOPICALLY  ONCE A WEEK 12 patch 3    fluticasone (FLONASE) 50 mcg/act nasal spray 2 sprays into each nostril daily      lisdexamfetamine (Vyvanse) 70 MG capsule Take 1 capsule (70 mg total) by mouth every morningMax Daily Amount: 70 mg 31 capsule 0    melatonin 1 mg Take 6 mg by mouth daily at bedtime        Synthroid 88 MCG tablet Take 1 tablet (88 mcg total) by mouth daily 90 tablet 2    traMADol (ULTRAM) 50 mg tablet Take 1 tablet (50 mg total) by mouth every 6 (six) hours as needed for moderate pain 30 tablet 0    traZODone (DESYREL) 50 mg tablet Take 1 5 tablets (75 mg total) by mouth daily at bedtime 135 tablet 0     No current facility-administered medications for this visit  No Known Allergies    Review of Systems    Video Exam    There were no vitals filed for this visit  Physical Exam     I spent 15 minutes directly with the patient during this visit      VIRTUAL VISIT DISCLAIMER    Teodora Whitlock acknowledges that she has consented to an online visit or consultation  She understands that the online visit is based solely on information provided by her, and that, in the absence of a face-to-face physical evaluation by the physician, the diagnosis she receives is both limited and provisional in terms of accuracy and completeness  This is not intended to replace a full medical face-to-face evaluation by the physician  Teodora Whitlock understands and accepts these terms

## 2021-04-27 NOTE — LETTER
April 27, 2021     Patient: Janay Causey   YOB: 1960   Date of Visit: 4/27/2021       To Whom it May Concern:    Janay Causey is under my professional care  She was seen in my office on 4/27/2021  She may return to work on 05/01/ 2021  If you have any questions or concerns, please don't hesitate to call           Sincerely,          Vidhi Wilhelm DO        CC: No Recipients

## 2021-05-05 DIAGNOSIS — F33.42 RECURRENT MAJOR DEPRESSIVE DISORDER, IN FULL REMISSION (HCC): ICD-10-CM

## 2021-06-15 DIAGNOSIS — F33.42 RECURRENT MAJOR DEPRESSIVE DISORDER, IN FULL REMISSION (HCC): ICD-10-CM

## 2021-06-15 NOTE — TELEPHONE ENCOUNTER
Pt LM on refill line  Needs 3 scripts sent to [facility] in [place]       Vyvanse 70mg - 30 day supply  Trazodone 50mg 1 5 tablets a day - 90 day supply  Clonazepam 0 5mg - 90 day supply

## 2021-06-16 RX ORDER — CLONAZEPAM 0.5 MG/1
0.5 TABLET ORAL 2 TIMES DAILY
Qty: 180 TABLET | Refills: 0 | Status: SHIPPED | OUTPATIENT
Start: 2021-06-16 | End: 2021-09-28 | Stop reason: SDUPTHER

## 2021-06-16 RX ORDER — TRAZODONE HYDROCHLORIDE 50 MG/1
75 TABLET ORAL
Qty: 135 TABLET | Refills: 0 | Status: SHIPPED | OUTPATIENT
Start: 2021-06-16 | End: 2021-09-28 | Stop reason: SDUPTHER

## 2021-07-06 DIAGNOSIS — G43.709 CHRONIC MIGRAINE WITHOUT AURA WITHOUT STATUS MIGRAINOSUS, NOT INTRACTABLE: ICD-10-CM

## 2021-07-07 RX ORDER — TRAMADOL HYDROCHLORIDE 50 MG/1
50 TABLET ORAL EVERY 6 HOURS PRN
Qty: 30 TABLET | Refills: 0 | Status: SHIPPED | OUTPATIENT
Start: 2021-07-07 | End: 2021-10-12 | Stop reason: SDUPTHER

## 2021-08-18 DIAGNOSIS — F33.42 RECURRENT MAJOR DEPRESSIVE DISORDER, IN FULL REMISSION (HCC): ICD-10-CM

## 2021-09-28 DIAGNOSIS — F33.42 RECURRENT MAJOR DEPRESSIVE DISORDER, IN FULL REMISSION (HCC): ICD-10-CM

## 2021-09-30 RX ORDER — CLONAZEPAM 0.5 MG/1
0.5 TABLET ORAL 2 TIMES DAILY
Qty: 180 TABLET | Refills: 0 | Status: SHIPPED | OUTPATIENT
Start: 2021-09-30 | End: 2021-11-30 | Stop reason: SDUPTHER

## 2021-09-30 RX ORDER — TRAZODONE HYDROCHLORIDE 50 MG/1
75 TABLET ORAL
Qty: 135 TABLET | Refills: 0 | Status: SHIPPED | OUTPATIENT
Start: 2021-09-30 | End: 2021-11-30 | Stop reason: SDUPTHER

## 2021-10-12 DIAGNOSIS — G43.709 CHRONIC MIGRAINE WITHOUT AURA WITHOUT STATUS MIGRAINOSUS, NOT INTRACTABLE: ICD-10-CM

## 2021-10-13 RX ORDER — TRAMADOL HYDROCHLORIDE 50 MG/1
50 TABLET ORAL EVERY 6 HOURS PRN
Qty: 30 TABLET | Refills: 0 | Status: SHIPPED | OUTPATIENT
Start: 2021-10-13 | End: 2022-01-04 | Stop reason: SDUPTHER

## 2021-11-15 DIAGNOSIS — F33.42 RECURRENT MAJOR DEPRESSIVE DISORDER, IN FULL REMISSION (HCC): ICD-10-CM

## 2021-11-30 ENCOUNTER — OFFICE VISIT (OUTPATIENT)
Dept: FAMILY MEDICINE CLINIC | Facility: CLINIC | Age: 61
End: 2021-11-30
Payer: COMMERCIAL

## 2021-11-30 VITALS
HEART RATE: 73 BPM | OXYGEN SATURATION: 96 % | SYSTOLIC BLOOD PRESSURE: 110 MMHG | DIASTOLIC BLOOD PRESSURE: 80 MMHG | TEMPERATURE: 98 F | WEIGHT: 148 LBS | BODY MASS INDEX: 27.23 KG/M2 | HEIGHT: 62 IN

## 2021-11-30 DIAGNOSIS — E03.9 PRIMARY HYPOTHYROIDISM: Primary | ICD-10-CM

## 2021-11-30 DIAGNOSIS — F33.42 RECURRENT MAJOR DEPRESSIVE DISORDER, IN FULL REMISSION (HCC): ICD-10-CM

## 2021-11-30 DIAGNOSIS — F98.8 ATTENTION DEFICIT DISORDER (ADD) WITHOUT HYPERACTIVITY: ICD-10-CM

## 2021-11-30 PROBLEM — D23.20: Status: ACTIVE | Noted: 2021-11-30

## 2021-11-30 PROBLEM — C44.612 BASAL CELL CARCINOMA (BCC) OF SKIN OF RIGHT UPPER EXTREMITY INCLUDING SHOULDER: Status: ACTIVE | Noted: 2021-11-30

## 2021-11-30 PROCEDURE — 99214 OFFICE O/P EST MOD 30 MIN: CPT | Performed by: FAMILY MEDICINE

## 2021-11-30 RX ORDER — CLINDAMYCIN PHOSPHATE 10 MG/G
GEL TOPICAL
COMMUNITY
Start: 2021-09-14

## 2021-11-30 RX ORDER — TRAZODONE HYDROCHLORIDE 50 MG/1
75 TABLET ORAL
Qty: 135 TABLET | Refills: 0 | Status: SHIPPED | OUTPATIENT
Start: 2021-11-30 | End: 2022-03-04 | Stop reason: SDUPTHER

## 2021-11-30 RX ORDER — CLONAZEPAM 0.5 MG/1
0.5 TABLET ORAL 2 TIMES DAILY
Qty: 180 TABLET | Refills: 0 | Status: SHIPPED | OUTPATIENT
Start: 2021-11-30 | End: 2022-03-07 | Stop reason: SDUPTHER

## 2022-01-02 DIAGNOSIS — Z78.0 MENOPAUSE: ICD-10-CM

## 2022-01-02 RX ORDER — ESTRADIOL 0.05 MG/D
PATCH TRANSDERMAL
Qty: 12 PATCH | Refills: 3 | Status: SHIPPED | OUTPATIENT
Start: 2022-01-02

## 2022-01-04 DIAGNOSIS — G43.709 CHRONIC MIGRAINE WITHOUT AURA WITHOUT STATUS MIGRAINOSUS, NOT INTRACTABLE: ICD-10-CM

## 2022-01-04 DIAGNOSIS — F33.42 RECURRENT MAJOR DEPRESSIVE DISORDER, IN FULL REMISSION (HCC): ICD-10-CM

## 2022-01-05 RX ORDER — TRAMADOL HYDROCHLORIDE 50 MG/1
50 TABLET ORAL EVERY 6 HOURS PRN
Qty: 30 TABLET | Refills: 0 | Status: SHIPPED | OUTPATIENT
Start: 2022-01-05 | End: 2022-03-07 | Stop reason: SDUPTHER

## 2022-01-21 ENCOUNTER — TELEPHONE (OUTPATIENT)
Dept: FAMILY MEDICINE CLINIC | Facility: CLINIC | Age: 62
End: 2022-01-21

## 2022-01-21 NOTE — TELEPHONE ENCOUNTER
Pt called about synthroid and said she spoke to Dr Winston Gonzalez about it previously  Synthroid is the only medication she can take and it requires prior auth  Will start prior auth now and follow up with Dr Winston Gonzalez if questions arise

## 2022-01-26 NOTE — TELEPHONE ENCOUNTER
New prior auth created as other timed out, new prior auth key is [id number]  LMOM for pt to call back regarding final prior authorization questions, if pt calls back please have pt answer questions on john RUBIO

## 2022-01-31 DIAGNOSIS — F33.42 RECURRENT MAJOR DEPRESSIVE DISORDER, IN FULL REMISSION (HCC): ICD-10-CM

## 2022-01-31 DIAGNOSIS — E03.9 PRIMARY HYPOTHYROIDISM: ICD-10-CM

## 2022-01-31 NOTE — TELEPHONE ENCOUNTER
Sent new prior auth in [place]: [id number]  Pt requested synthroid refill for 1 month out of pocket  Refill sent over, pt should be advised to request to pay out of pocket at pharmacy

## 2022-02-01 RX ORDER — LEVOTHYROXINE SODIUM 88 MCG
88 TABLET ORAL DAILY
Qty: 30 TABLET | Refills: 0 | Status: SHIPPED | OUTPATIENT
Start: 2022-02-01 | End: 2022-03-01 | Stop reason: SDUPTHER

## 2022-02-01 NOTE — TELEPHONE ENCOUNTER
Pt advised and is now requesting a refill on the Vyvanse 70 mg capsule to be sent to the [facility] pharmacy in [place]  Please advise  Thank you

## 2022-02-22 NOTE — TELEPHONE ENCOUNTER
Denial letter sent to pt via [employer], we also received form on C[employer]  Need signed authorization from pt to submit appeal on her behalf  Denial letter and appeals form scanned into chart

## 2022-03-01 DIAGNOSIS — E03.9 PRIMARY HYPOTHYROIDISM: ICD-10-CM

## 2022-03-01 NOTE — TELEPHONE ENCOUNTER
Pt called and stated that she needs refill of synthroid and was notified of denial and told to call appeals line as we cannot initiate

## 2022-03-01 NOTE — TELEPHONE ENCOUNTER
Failed protocol    Endocrinology:  Hypothyroid Agents Failed    TSH within 360 days    Valid encounter within last 12 months

## 2022-03-01 NOTE — TELEPHONE ENCOUNTER
Rcvd call from Robert @ [employer] to notify provider of prior auth denial  Appeal Dept ph# [phone]

## 2022-03-02 RX ORDER — LEVOTHYROXINE SODIUM 88 MCG
88 TABLET ORAL DAILY
Qty: 90 TABLET | Refills: 3 | Status: SHIPPED | OUTPATIENT
Start: 2022-03-02

## 2022-03-04 DIAGNOSIS — G43.709 CHRONIC MIGRAINE WITHOUT AURA WITHOUT STATUS MIGRAINOSUS, NOT INTRACTABLE: ICD-10-CM

## 2022-03-04 DIAGNOSIS — F33.42 RECURRENT MAJOR DEPRESSIVE DISORDER, IN FULL REMISSION (HCC): ICD-10-CM

## 2022-03-04 RX ORDER — TRAMADOL HYDROCHLORIDE 50 MG/1
50 TABLET ORAL EVERY 6 HOURS PRN
Qty: 30 TABLET | Refills: 0 | Status: CANCELLED | OUTPATIENT
Start: 2022-03-04

## 2022-03-04 RX ORDER — TRAZODONE HYDROCHLORIDE 50 MG/1
75 TABLET ORAL
Qty: 135 TABLET | Refills: 0 | Status: SHIPPED | OUTPATIENT
Start: 2022-03-04 | End: 2022-05-31 | Stop reason: SDUPTHER

## 2022-03-04 RX ORDER — CLONAZEPAM 0.5 MG/1
0.5 TABLET ORAL 2 TIMES DAILY
Qty: 180 TABLET | Refills: 0 | Status: CANCELLED | OUTPATIENT
Start: 2022-03-04 | End: 2022-06-02

## 2022-03-04 NOTE — TELEPHONE ENCOUNTER
Failed protocol       Psychiatry:  Anxiolytics/Hypnotics Failed     This refill cannot be delegated    Valid encounter within last 6 months         Psychiatry:  Anxiolytics/Hypnotics Failed     This refill cannot be delegated    Valid encounter within last 6 months         Analgesics: Opioid Agonists Failed    This refill cannot be delegated    Valid encounter within last 3 months

## 2022-03-07 DIAGNOSIS — G43.709 CHRONIC MIGRAINE WITHOUT AURA WITHOUT STATUS MIGRAINOSUS, NOT INTRACTABLE: ICD-10-CM

## 2022-03-07 DIAGNOSIS — F33.42 RECURRENT MAJOR DEPRESSIVE DISORDER, IN FULL REMISSION (HCC): ICD-10-CM

## 2022-03-07 RX ORDER — TRAMADOL HYDROCHLORIDE 50 MG/1
50 TABLET ORAL EVERY 6 HOURS PRN
Qty: 30 TABLET | Refills: 0 | Status: SHIPPED | OUTPATIENT
Start: 2022-03-07 | End: 2022-05-31 | Stop reason: SDUPTHER

## 2022-03-07 RX ORDER — CLONAZEPAM 0.5 MG/1
0.5 TABLET ORAL 2 TIMES DAILY
Qty: 180 TABLET | Refills: 0 | Status: CANCELLED | OUTPATIENT
Start: 2022-03-07 | End: 2022-06-05

## 2022-03-07 RX ORDER — CLONAZEPAM 0.5 MG/1
0.5 TABLET ORAL 2 TIMES DAILY
Qty: 180 TABLET | Refills: 0 | Status: SHIPPED | OUTPATIENT
Start: 2022-03-07 | End: 2022-05-31 | Stop reason: SDUPTHER

## 2022-03-07 RX ORDER — TRAMADOL HYDROCHLORIDE 50 MG/1
50 TABLET ORAL EVERY 6 HOURS PRN
Qty: 30 TABLET | Refills: 0 | Status: CANCELLED | OUTPATIENT
Start: 2022-03-07

## 2022-04-12 DIAGNOSIS — F33.42 RECURRENT MAJOR DEPRESSIVE DISORDER, IN FULL REMISSION (HCC): ICD-10-CM

## 2022-04-12 DIAGNOSIS — Z12.31 ENCOUNTER FOR SCREENING MAMMOGRAM FOR MALIGNANT NEOPLASM OF BREAST: Primary | ICD-10-CM

## 2022-04-12 NOTE — TELEPHONE ENCOUNTER
Failed Protocol    Psychiatry:  Stimulants/ADHD Failed    This refill cannot be delegated    Valid encounter within last 6 months

## 2022-05-03 ENCOUNTER — HOSPITAL ENCOUNTER (OUTPATIENT)
Dept: MAMMOGRAPHY | Facility: HOSPITAL | Age: 62
Discharge: HOME | End: 2022-05-03
Payer: COMMERCIAL

## 2022-05-03 VITALS — BODY MASS INDEX: 25.76 KG/M2 | HEIGHT: 62 IN | WEIGHT: 140 LBS

## 2022-05-03 DIAGNOSIS — Z12.31 ENCOUNTER FOR SCREENING MAMMOGRAM FOR MALIGNANT NEOPLASM OF BREAST: ICD-10-CM

## 2022-05-03 PROCEDURE — 77067 SCR MAMMO BI INCL CAD: CPT

## 2022-05-03 PROCEDURE — 77063 BREAST TOMOSYNTHESIS BI: CPT

## 2022-05-25 DIAGNOSIS — F33.42 RECURRENT MAJOR DEPRESSIVE DISORDER, IN FULL REMISSION (HCC): ICD-10-CM

## 2022-05-31 ENCOUNTER — OFFICE VISIT (OUTPATIENT)
Dept: FAMILY MEDICINE CLINIC | Facility: CLINIC | Age: 62
End: 2022-05-31
Payer: COMMERCIAL

## 2022-05-31 VITALS
DIASTOLIC BLOOD PRESSURE: 68 MMHG | HEART RATE: 83 BPM | WEIGHT: 148.4 LBS | BODY MASS INDEX: 26.29 KG/M2 | TEMPERATURE: 97.8 F | SYSTOLIC BLOOD PRESSURE: 112 MMHG | HEIGHT: 63 IN | OXYGEN SATURATION: 99 %

## 2022-05-31 DIAGNOSIS — Z13.820 SCREENING FOR OSTEOPOROSIS: ICD-10-CM

## 2022-05-31 DIAGNOSIS — Z00.00 WELL ADULT EXAM: Primary | ICD-10-CM

## 2022-05-31 DIAGNOSIS — F33.42 RECURRENT MAJOR DEPRESSIVE DISORDER, IN FULL REMISSION (HCC): ICD-10-CM

## 2022-05-31 DIAGNOSIS — Z78.0 MENOPAUSE: ICD-10-CM

## 2022-05-31 DIAGNOSIS — G43.709 CHRONIC MIGRAINE WITHOUT AURA WITHOUT STATUS MIGRAINOSUS, NOT INTRACTABLE: ICD-10-CM

## 2022-05-31 PROCEDURE — 99396 PREV VISIT EST AGE 40-64: CPT | Performed by: FAMILY MEDICINE

## 2022-05-31 RX ORDER — TRAMADOL HYDROCHLORIDE 50 MG/1
50 TABLET ORAL EVERY 6 HOURS PRN
Qty: 30 TABLET | Refills: 0 | Status: SHIPPED | OUTPATIENT
Start: 2022-05-31 | End: 2022-07-27 | Stop reason: SDUPTHER

## 2022-05-31 RX ORDER — TRAZODONE HYDROCHLORIDE 50 MG/1
75 TABLET ORAL
Qty: 135 TABLET | Refills: 0 | Status: SHIPPED | OUTPATIENT
Start: 2022-05-31 | End: 2022-08-29

## 2022-05-31 RX ORDER — CLONAZEPAM 0.5 MG/1
0.5 TABLET ORAL 2 TIMES DAILY
Qty: 180 TABLET | Refills: 0 | Status: SHIPPED | OUTPATIENT
Start: 2022-05-31 | End: 2022-08-29

## 2022-05-31 NOTE — PROGRESS NOTES
Assessment/Plan:  Anticipatory guidance provided  Recommend regular mammography  Colon cancer screening every 10 years  1  Well adult exam    2  Screening for osteoporosis  -     DXA bone density spine hip and pelvis; Future; Expected date: 05/31/2022    3  [facility]  -     clonazePAM (KlonoPIN) 0 5 mg tablet; Take 1 tablet (0 5 mg total) by mouth 2 (two) times a day  -     traZODone (DESYREL) 50 mg tablet; Take 1 5 tablets (75 mg total) by mouth daily at bedtime    4  Chronic migraine without aura without status migrainosus, not intractable  Comments:  No increased frequency or severity of headaches  Continue current medication as needed  Orders:  -     traMADol (ULTRAM) 50 mg tablet; Take 1 tablet (50 mg total) by mouth every 6 (six) hours as needed for moderate pain    5  Menopause  -     DXA bone density spine hip and pelvis; Future; Expected date: 05/31/2022          Subjective:      Patient ID: Herve Face is a 65 y o  female  Patient here for physical   She is generally feeling well  She did have a lipoma removed from the left lower abdomen and has some scar tissue that is she being revised surgically by Dermatology in a few weeks  The following portions of the patient's history were reviewed and updated as appropriate: allergies, current medications, past family history, past medical history, past social history, past surgical history, and problem list     Review of Systems   Constitutional: Negative  HENT: Negative  Eyes: Negative  Respiratory: Negative  Cardiovascular: Negative  Gastrointestinal: Negative  Endocrine: Negative  Genitourinary: Negative  Musculoskeletal: Negative  Skin: Negative  Allergic/Immunologic: Negative  Neurological: Negative  Hematological: Negative  Psychiatric/Behavioral: Negative            Objective:      /68 (BP Location: Left arm, Patient Position: Sitting, Cuff Size: Standard)   Pulse 83   Temp 97 8 °F (36 6 °C) (Temporal)   Ht 5' 3" (1 6 m)   Wt 67 3 kg (148 lb 6 4 oz)   LMP 04/25/2016   SpO2 99%   BMI 26 29 kg/m²          Physical Exam  Vitals reviewed  Constitutional:       Appearance: She is well-developed  HENT:      Head: Normocephalic and atraumatic  Right Ear: External ear normal  Tympanic membrane is not erythematous or bulging  Left Ear: External ear normal  Tympanic membrane is not erythematous or bulging  Nose: Nose normal       Mouth/Throat:      Mouth: No oral lesions  Pharynx: No oropharyngeal exudate  Eyes:      General: No scleral icterus  Right eye: No discharge  Left eye: No discharge  Conjunctiva/sclera: Conjunctivae normal    Neck:      Thyroid: No thyromegaly  Cardiovascular:      Rate and Rhythm: Normal rate and regular rhythm  Heart sounds: Normal heart sounds  No murmur heard  No friction rub  No gallop  Pulmonary:      Effort: Pulmonary effort is normal  No respiratory distress  Breath sounds: No wheezing or rales  Chest:      Chest wall: No tenderness  Abdominal:      General: Bowel sounds are normal  There is no distension  Palpations: Abdomen is soft  There is no mass  Tenderness: There is no abdominal tenderness  There is no guarding or rebound  Musculoskeletal:         General: No tenderness or deformity  Normal range of motion  Cervical back: Normal range of motion and neck supple  Lymphadenopathy:      Cervical: No cervical adenopathy  Skin:     General: Skin is warm and dry  Coloration: Skin is not pale  Findings: No erythema or rash  Neurological:      Mental Status: She is alert and oriented to person, place, and time  Cranial Nerves: No cranial nerve deficit  Motor: No abnormal muscle tone  Coordination: Coordination normal       Deep Tendon Reflexes: Reflexes are normal and symmetric     Psychiatric: Behavior: Behavior normal

## 2022-05-31 NOTE — PROGRESS NOTES
BMI Counseling: Body mass index is 26 29 kg/m²  The BMI is above normal  Nutrition recommendations include reducing portion sizes

## 2022-06-29 DIAGNOSIS — F33.42 RECURRENT MAJOR DEPRESSIVE DISORDER, IN FULL REMISSION (HCC): ICD-10-CM

## 2022-06-29 NOTE — TELEPHONE ENCOUNTER
Pt LM on refill line requesting refill on:  lisdexamfetamine (Vyvanse) 70 MG capsule    To be sent to [facility] in [place] [address]

## 2022-06-30 NOTE — TELEPHONE ENCOUNTER
Failed protocol    Psychiatry:  Stimulants/ADHD Failed 06/29/2022 05:14 PM    This refill cannot be delegated    Valid encounter within last 6 months

## 2022-07-27 DIAGNOSIS — F33.42 RECURRENT MAJOR DEPRESSIVE DISORDER, IN FULL REMISSION (HCC): ICD-10-CM

## 2022-07-27 DIAGNOSIS — G43.709 CHRONIC MIGRAINE WITHOUT AURA WITHOUT STATUS MIGRAINOSUS, NOT INTRACTABLE: ICD-10-CM

## 2022-07-28 RX ORDER — TRAMADOL HYDROCHLORIDE 50 MG/1
50 TABLET ORAL EVERY 6 HOURS PRN
Qty: 30 TABLET | Refills: 0 | Status: SHIPPED | OUTPATIENT
Start: 2022-07-28 | End: 2022-10-04 | Stop reason: SDUPTHER

## 2022-09-06 DIAGNOSIS — F33.42 RECURRENT MAJOR DEPRESSIVE DISORDER, IN FULL REMISSION (HCC): ICD-10-CM

## 2022-09-06 RX ORDER — TRAZODONE HYDROCHLORIDE 50 MG/1
75 TABLET ORAL
Qty: 135 TABLET | Refills: 0 | Status: SHIPPED | OUTPATIENT
Start: 2022-09-06 | End: 2022-10-04 | Stop reason: SDUPTHER

## 2022-09-06 NOTE — TELEPHONE ENCOUNTER
Pt LM on refill line requesting refill on:  traZODone (DESYREL) 50 mg tablet   clonazePAM (KlonoPIN) 0 5 mg tablet     To be sent to [facility] in [place] [address]

## 2022-09-06 NOTE — TELEPHONE ENCOUNTER
Failed protocol    Psychiatry:  Anxiolytics/Hypnotics Failed 09/06/2022 03:50 PM    This refill cannot be delegated    Valid encounter within last 6 months

## 2022-09-09 RX ORDER — CLONAZEPAM 0.5 MG/1
0.5 TABLET ORAL 2 TIMES DAILY
Qty: 180 TABLET | Refills: 0 | Status: SHIPPED | OUTPATIENT
Start: 2022-09-09 | End: 2022-12-08

## 2022-09-22 ENCOUNTER — CLINICAL SUPPORT (OUTPATIENT)
Dept: FAMILY MEDICINE CLINIC | Facility: CLINIC | Age: 62
End: 2022-09-22
Payer: COMMERCIAL

## 2022-09-22 DIAGNOSIS — Z23 NEED FOR VACCINATION: Primary | ICD-10-CM

## 2022-09-22 PROCEDURE — 90750 HZV VACC RECOMBINANT IM: CPT

## 2022-09-22 PROCEDURE — 90471 IMMUNIZATION ADMIN: CPT

## 2022-10-04 DIAGNOSIS — G43.709 CHRONIC MIGRAINE WITHOUT AURA WITHOUT STATUS MIGRAINOSUS, NOT INTRACTABLE: ICD-10-CM

## 2022-10-04 DIAGNOSIS — F33.42 RECURRENT MAJOR DEPRESSIVE DISORDER, IN FULL REMISSION (HCC): ICD-10-CM

## 2022-10-04 RX ORDER — TRAMADOL HYDROCHLORIDE 50 MG/1
50 TABLET ORAL EVERY 6 HOURS PRN
Qty: 30 TABLET | Refills: 0 | Status: SHIPPED | OUTPATIENT
Start: 2022-10-04

## 2022-10-04 RX ORDER — TRAZODONE HYDROCHLORIDE 50 MG/1
75 TABLET ORAL
Qty: 135 TABLET | Refills: 0 | Status: SHIPPED | OUTPATIENT
Start: 2022-10-04 | End: 2023-01-02

## 2022-10-04 NOTE — TELEPHONE ENCOUNTER
Failed protocol    Analgesics:  Opioid Agonists Failed 10/04/2022 02:40 PM    This refill cannot be delegated    Valid encounter within last 3 months

## 2022-10-04 NOTE — TELEPHONE ENCOUNTER
Pt LM on refill line requesting refill on:  lisdexamfetamine (Vyvanse) 70 MG capsule   traMADol (ULTRAM) 50 mg tablet     To be sent to [facility] in [place] [address]

## 2022-10-04 NOTE — TELEPHONE ENCOUNTER
Pt LM on refill line stating she needs a refill on:  traMADol (ULTRAM) 50 mg tablet     To be sent to [facility] in [place] [address]

## 2022-11-03 DIAGNOSIS — F33.42 RECURRENT MAJOR DEPRESSIVE DISORDER, IN FULL REMISSION (HCC): ICD-10-CM

## 2022-11-06 DIAGNOSIS — Z78.0 MENOPAUSE: ICD-10-CM

## 2022-11-06 RX ORDER — ESTRADIOL 0.05 MG/D
PATCH TRANSDERMAL
Qty: 12 PATCH | Refills: 3 | Status: SHIPPED | OUTPATIENT
Start: 2022-11-06

## 2022-12-12 DIAGNOSIS — G43.709 CHRONIC MIGRAINE WITHOUT AURA WITHOUT STATUS MIGRAINOSUS, NOT INTRACTABLE: ICD-10-CM

## 2022-12-12 DIAGNOSIS — F33.42 RECURRENT MAJOR DEPRESSIVE DISORDER, IN FULL REMISSION (HCC): ICD-10-CM

## 2022-12-12 NOTE — TELEPHONE ENCOUNTER
Protocol Fail  clonazePAM (KlonoPIN) 0 5 mg tablet          Sig: Take 1 tablet (0 5 mg total) by mouth 2 (two) times a day    Disp:  180 tablet    Refills:  0    Start: 12/12/2022 - 3/12/2023    Class: Normal    Non-formulary For: Recurrent major depressive disorder, in full remission ([facility] )    To pharmacy: This request is for a new prescription for a controlled substance as required by Federal/State law  Last ordered: 3 months ago by Alexi Terrell DO     Psychiatry:  Anxiolytics/Hypnotics Failed 12/12/2022 04:40 PM   Protocol Details  This refill cannot be delegated    Valid encounter within last 6 months       traMADol (ULTRAM) 50 mg tablet         Sig: Take 1 tablet (50 mg total) by mouth every 6 (six) hours as needed for moderate pain    Disp:  30 tablet    Refills:  0    Start: 12/12/2022    Class: Normal    Non-formulary For: Chronic migraine without aura without status migrainosus, not intractable    To pharmacy: This request is for a new prescription for a controlled substance as required by Federal/State law      Last ordered: 2 months ago by Alexi Terrell DO     Analgesics:  Opioid Agonists Failed 12/12/2022 04:40 PM   Protocol Details  This refill cannot be delegated    Valid encounter within last 3 months

## 2022-12-14 RX ORDER — TRAMADOL HYDROCHLORIDE 50 MG/1
50 TABLET ORAL EVERY 6 HOURS PRN
Qty: 30 TABLET | Refills: 0 | Status: SHIPPED | OUTPATIENT
Start: 2022-12-14

## 2022-12-14 RX ORDER — CLONAZEPAM 0.5 MG/1
0.5 TABLET ORAL 2 TIMES DAILY
Qty: 180 TABLET | Refills: 0 | Status: SHIPPED | OUTPATIENT
Start: 2022-12-14 | End: 2023-03-14

## 2023-01-10 DIAGNOSIS — F33.42 RECURRENT MAJOR DEPRESSIVE DISORDER, IN FULL REMISSION (HCC): ICD-10-CM

## 2023-01-10 NOTE — TELEPHONE ENCOUNTER
Protocol Fail  lisdexamfetamine (Vyvanse) 70 MG capsule          Sig: Take 1 capsule (70 mg total) by mouth every morning Max Daily Amount: 70 mg    Disp:  30 capsule    Refills:  0    Start: 1/10/2023    Earliest Fill Date: 1/10/2023    Class: Normal    Non-formulary For: Recurrent major depressive disorder, in full remission ([facility] )    Last ordered: 2 months ago by Haydee Reed DO     Psychiatry:  Stimulants/ADHD Failed 01/10/2023 04:08 PM   Protocol Details  This refill cannot be delegated    Valid encounter within last 6 months

## 2023-01-11 DIAGNOSIS — F33.42 RECURRENT MAJOR DEPRESSIVE DISORDER, IN FULL REMISSION (HCC): ICD-10-CM

## 2023-01-11 RX ORDER — LISDEXAMFETAMINE DIMESYLATE CAPSULES 70 MG/1
70 CAPSULE ORAL EVERY MORNING
Qty: 30 CAPSULE | Refills: 0 | Status: SHIPPED | OUTPATIENT
Start: 2023-01-11 | End: 2023-02-28 | Stop reason: SDUPTHER

## 2023-01-11 NOTE — TELEPHONE ENCOUNTER
Per last OV on 05/31/22, pt should return in 1 year  Just to clarify before I call pt, how frequently would you like to see her?

## 2023-01-25 DIAGNOSIS — Z78.0 MENOPAUSE: ICD-10-CM

## 2023-01-25 RX ORDER — ESTRADIOL 0.05 MG/D
1 PATCH TRANSDERMAL WEEKLY
Qty: 12 PATCH | Refills: 3 | Status: SHIPPED | OUTPATIENT
Start: 2023-01-25

## 2023-02-11 DIAGNOSIS — E03.9 PRIMARY HYPOTHYROIDISM: ICD-10-CM

## 2023-02-13 RX ORDER — LEVOTHYROXINE SODIUM 88 MCG
TABLET ORAL
Qty: 90 TABLET | Refills: 3 | Status: SHIPPED | OUTPATIENT
Start: 2023-02-13

## 2023-02-15 ENCOUNTER — TELEMEDICINE (OUTPATIENT)
Dept: FAMILY MEDICINE CLINIC | Facility: CLINIC | Age: 63
End: 2023-02-15

## 2023-02-15 DIAGNOSIS — U07.1 COVID-19: Primary | ICD-10-CM

## 2023-02-15 RX ORDER — NIRMATRELVIR AND RITONAVIR 300-100 MG
3 KIT ORAL 2 TIMES DAILY
Qty: 30 TABLET | Refills: 0 | Status: SHIPPED | OUTPATIENT
Start: 2023-02-15 | End: 2023-02-20

## 2023-02-15 NOTE — PROGRESS NOTES
Virtual Regular Visit    Verification of patient location:    Patient is located in the following state in which I hold an active license [place]      Assessment/Plan: Side effect profile of medication reviewed  Recommend return to office if no improvement or worsening symptoms  Problem List Items Addressed This Visit        Other    COVID-19 - Primary    Relevant Medications    nirmatrelvir & ritonavir (Paxlovid, 300/100,) tablet therapy pack            Reason for visit is   Chief Complaint   Patient presents with   • Virtual Regular Visit        Encounter provider Luan Sahni DO    Provider located at [address] [place] [address] [address] [id number]      Recent Visits  No visits were found meeting these conditions  Showing recent visits within past 7 days and meeting all other requirements  Today's Visits  Date Type Provider Dept   02/15/23 Telemedicine Luan Sahni DO [facility], [place]   Showing today's visits and meeting all other requirements  Future Appointments  No visits were found meeting these conditions  Showing future appointments within next 150 days and meeting all other requirements       The patient was identified by name and date of birth  Cristian Posadas was informed that this is a telemedicine visit and that the visit is being conducted through the [address] [employer] platform  She agrees to proceed     My office door was closed  No one else was in the room  She acknowledged consent and understanding of privacy and security of the video platform  The patient has agreed to participate and understands they can discontinue the visit at any time  Patient is aware this is a billable service  Subjective  Cristian Posadas is a 65 y o  female for congestion  Patient seen with COVID symptoms x2 days  She has mild to moderate symptoms  She has had COVID once before, about 2 years ago  She tested positive for COVID today    Denies any significant GI complaints aside from loss of appetite         Past Medical History:   Diagnosis Date   • Allergic rhinitis     last assessed 03/20/2014   • Anxiety    • Contact dermatitis     last assessed 09/16/2016   • Dehydration     last assessed 02/07/2013   • Depression    • Fibromyalgia    • Hx of migraine headaches    • Hypothyroid    • Leiomyoma of uterus    • Low grade squamous intraepith lesion on cytologic smear cervix (lgsil)     last assessed 11/16/2015   • Nausea    • PONV (postoperative nausea and vomiting)    • Sleep disturbance    • Symptomatic menopausal or female climacteric states     last assessed 10/26/2015   • Uterine fibroid        Past Surgical History:   Procedure Laterality Date   • BREAST BIOPSY Left benign  20yrs ago   • HYSTERECTOMY     • LAPAROTOMY N/A 8/15/2016    Procedure: MINI LAPAROTOMY FOR MYOMA REMOVAL ;  Surgeon: Marlene Garcia DO;  Location: [facility];  Service:    • NASAL SEPTUM SURGERY     • IL LAP,VAG HYST,UTERUS 250GMS/< N/A 8/15/2016    Procedure: LAPAROSCOPIC ASSISTED VAGINAL HYSTERECTOMY ;  Surgeon: Marlene Garcia DO;  Location: [facility];  Service: Gynecology   • TONSILLECTOMY     • TUBAL LIGATION         Current Outpatient Medications   Medication Sig Dispense Refill   • nirmatrelvir & ritonavir (Paxlovid, 300/100,) tablet therapy pack Take 3 tablets by mouth 2 (two) times a day for 5 days Take 2 nirmatrelvir tablets + 1 ritonavir tablet together per dose 30 tablet 0   • butalbital-aspirin-caffeine (BUTALBITAL COMPOUND/ASA) -40 MG per tablet Take 1 tablet by mouth every 4 (four) hours as needed for headaches 90 tablet 1   • clindamycin (CLINDAGEL) 1 % gel APPLY THIN LAYER TOPICALLY TO AFFECTED AREA TWICE DAILY     • clonazePAM (KlonoPIN) 0 5 mg tablet Take 1 tablet (0 5 mg total) by mouth 2 (two) times a day 180 tablet 0   • estradiol (CLIMARA) 0 05 mg/24 hr Place 1 patch on the skin over 7 days once a week 12 patch 3   • fluticasone (FLONASE) 50 mcg/act nasal spray 2 sprays into each nostril daily     • lisdexamfetamine (Vyvanse) 70 MG capsule Take 1 capsule (70 mg total) by mouth every morning Max Daily Amount: 70 mg 30 capsule 0   • melatonin 1 mg Take 6 mg by mouth daily at bedtime   (Patient not taking: No sig reported)     • Synthroid 88 MCG tablet TAKE 1 TABLET BY MOUTH EVERY DAY 90 tablet 3   • traMADol (ULTRAM) 50 mg tablet Take 1 tablet (50 mg total) by mouth every 6 (six) hours as needed for moderate pain 30 tablet 0   • traZODone (DESYREL) 50 mg tablet Take 1 5 tablets (75 mg total) by mouth daily at bedtime 135 tablet 0     No current facility-administered medications for this visit  No Known Allergies    Review of Systems   Constitutional: Negative  Negative for fever and unexpected weight change  HENT: Positive for congestion  Eyes: Negative  Respiratory: Positive for cough  Negative for shortness of breath  Cardiovascular: Negative  Gastrointestinal: Negative  Endocrine: Negative  Genitourinary: Negative  Musculoskeletal: Negative  Skin: Negative  Allergic/Immunologic: Negative  Neurological: Negative  Hematological: Negative  Psychiatric/Behavioral: Negative  Video Exam    There were no vitals filed for this visit  Physical Exam  Constitutional:       General: She is not in acute distress  Appearance: She is well-developed  She is not diaphoretic  Neurological:      Mental Status: She is alert and oriented to person, place, and time  Psychiatric:         Behavior: Behavior normal          Thought Content:  Thought content normal          Judgment: Judgment normal           I spent 15 minutes directly with the patient during this visit

## 2023-02-21 ENCOUNTER — TELEPHONE (OUTPATIENT)
Dept: FAMILY MEDICINE CLINIC | Facility: CLINIC | Age: 63
End: 2023-02-21

## 2023-02-21 NOTE — TELEPHONE ENCOUNTER
Received a call from the patient stating that her hearing has still not been back to normal since having Covid (virtual visit 2/15/23 ) She was wondering if there is something she can do for that or if it comes back with time  She goes back to work Friday and is hoping to have hearing improvement

## 2023-02-28 DIAGNOSIS — F33.42 RECURRENT MAJOR DEPRESSIVE DISORDER, IN FULL REMISSION (HCC): ICD-10-CM

## 2023-02-28 DIAGNOSIS — G43.709 CHRONIC MIGRAINE WITHOUT AURA WITHOUT STATUS MIGRAINOSUS, NOT INTRACTABLE: ICD-10-CM

## 2023-02-28 RX ORDER — TRAMADOL HYDROCHLORIDE 50 MG/1
50 TABLET ORAL EVERY 6 HOURS PRN
Qty: 30 TABLET | Refills: 0 | Status: SHIPPED | OUTPATIENT
Start: 2023-02-28

## 2023-02-28 NOTE — TELEPHONE ENCOUNTER
Protocol Fail   traMADol (ULTRAM) 50 mg tablet         Sig: Take 1 tablet (50 mg total) by mouth every 6 (six) hours as needed for moderate pain    Disp:  30 tablet    Refills:  0    Start: 2/28/2023    Class: Normal    For: Chronic migraine without aura without status migrainosus, not intractable    To pharmacy: This request is for a new prescription for a controlled substance as required by Federal/State law      Last ordered: 2 months ago by Jonah Mantilla DO     Analgesics:  Opioid Agonists Failed 02/28/2023 03:31 PM   Protocol Details  This refill cannot be delegated    Valid encounter within last 3 months       lisdexamfetamine (Vyvanse) 70 MG capsule         Sig: Take 1 capsule (70 mg total) by mouth every morning Max Daily Amount: 70 mg    Disp:  30 capsule    Refills:  0    Start: 2/28/2023    Earliest Fill Date: 2/28/2023    Class: Normal    For: Recurrent major depressive disorder, in full remission [facility])    Last ordered: 1 month ago by Jonah Mantilla DO     Psychiatry:  Stimulants/ADHD Failed 02/28/2023 03:31 PM   Protocol Details  This refill cannot be delegated    Valid encounter within last 6 months      To be filled at: [facility]/pharmacy #[id number]

## 2023-03-15 DIAGNOSIS — F33.42 RECURRENT MAJOR DEPRESSIVE DISORDER, IN FULL REMISSION (HCC): ICD-10-CM

## 2023-03-15 RX ORDER — TRAZODONE HYDROCHLORIDE 50 MG/1
75 TABLET ORAL
Qty: 135 TABLET | Refills: 0 | Status: SHIPPED | OUTPATIENT
Start: 2023-03-15 | End: 2023-06-13

## 2023-03-15 RX ORDER — CLONAZEPAM 0.5 MG/1
0.5 TABLET ORAL 2 TIMES DAILY
Qty: 180 TABLET | Refills: 0 | Status: SHIPPED | OUTPATIENT
Start: 2023-03-15 | End: 2023-06-13

## 2023-03-15 NOTE — TELEPHONE ENCOUNTER
Pt LM on refill line requesting refill on:  clonazePAM (KlonoPIN) 0 5 mg tablet   traZODone (DESYREL) 50 mg tablet     To be sent to [facility] in [place] [address]

## 2023-03-15 NOTE — TELEPHONE ENCOUNTER
Failed protocol  Psychiatry:  Anxiolytics/Hypnotics Failed 03/15/2023 10:05 AM    This refill cannot be delegated    Valid encounter within last 6 months

## 2023-04-04 DIAGNOSIS — F33.42 RECURRENT MAJOR DEPRESSIVE DISORDER, IN FULL REMISSION (HCC): ICD-10-CM

## 2023-04-04 NOTE — TELEPHONE ENCOUNTER
Pt LM on refill line requesting refill on:  lisdexamfetamine (Vyvanse) 70 MG capsule     To be sent to [facility] in [place] [address]

## 2023-04-05 NOTE — TELEPHONE ENCOUNTER
Protocol fail  lisdexamfetamine (Vyvanse) 70 MG capsule          Sig: Take 1 capsule (70 mg total) by mouth every morning Max Daily Amount: 70 mg    Disp:  30 capsule    Refills:  1    Start: 4/4/2023    Earliest Fill Date: 4/4/2023    Class: Normal    For: Recurrent major depressive disorder, in full remission ([facility] [address] )    Last ordered: 1 month ago by Brendan Krishnan DO     Psychiatry:  Stimulants/ADHD Failed 04/04/2023 02:41 PM   Protocol Details  This refill cannot be delegated    Valid encounter within last 6 months      [id number] [address] [place] [address]

## 2023-05-04 DIAGNOSIS — F33.42 RECURRENT MAJOR DEPRESSIVE DISORDER, IN FULL REMISSION (HCC): ICD-10-CM

## 2023-05-04 NOTE — TELEPHONE ENCOUNTER
lisdexamfetamine (Vyvanse) 70 MG capsule          Sig: Take 1 capsule (70 mg total) by mouth every morning Max Daily Amount: 70 mg    Disp:  30 capsule    Refills:  0    Start: 5/4/2023    Earliest Fill Date: 5/4/2023    Class: Normal    For: Recurrent major depressive disorder, in full remission ([facility] [address] )    Last ordered: 4 weeks ago by Luh Arechiga DO     Psychiatry:  Stimulants/ADHD Failed 05/04/2023 03:51 PM   Protocol Details  This refill cannot be delegated    Valid encounter within last 6 months      To be filled at: [facility]/pharmacy #[id number]- [address]

## 2023-05-05 NOTE — TELEPHONE ENCOUNTER
Patient has not been seen physically in the office for a year  Recommend office evaluation prior to medication refill

## 2023-05-10 ENCOUNTER — OFFICE VISIT (OUTPATIENT)
Dept: FAMILY MEDICINE CLINIC | Facility: CLINIC | Age: 63
End: 2023-05-10

## 2023-05-10 VITALS
SYSTOLIC BLOOD PRESSURE: 138 MMHG | OXYGEN SATURATION: 99 % | WEIGHT: 143 LBS | HEIGHT: 63 IN | HEART RATE: 68 BPM | TEMPERATURE: 97 F | BODY MASS INDEX: 25.34 KG/M2 | DIASTOLIC BLOOD PRESSURE: 84 MMHG

## 2023-05-10 DIAGNOSIS — E03.9 PRIMARY HYPOTHYROIDISM: ICD-10-CM

## 2023-05-10 DIAGNOSIS — Z12.31 ENCOUNTER FOR SCREENING MAMMOGRAM FOR MALIGNANT NEOPLASM OF BREAST: Primary | ICD-10-CM

## 2023-05-10 DIAGNOSIS — F33.42 RECURRENT MAJOR DEPRESSIVE DISORDER, IN FULL REMISSION (HCC): ICD-10-CM

## 2023-05-10 DIAGNOSIS — G43.709 CHRONIC MIGRAINE WITHOUT AURA WITHOUT STATUS MIGRAINOSUS, NOT INTRACTABLE: ICD-10-CM

## 2023-05-10 RX ORDER — TRAMADOL HYDROCHLORIDE 50 MG/1
50 TABLET ORAL EVERY 6 HOURS PRN
Qty: 30 TABLET | Refills: 0 | Status: SHIPPED | OUTPATIENT
Start: 2023-05-10

## 2023-05-10 RX ORDER — BUTALBITAL, ASPIRIN AND CAFFEINE 50; 325; 40 MG/1; MG/1; MG/1
1 TABLET ORAL EVERY 4 HOURS PRN
Qty: 90 TABLET | Refills: 1 | Status: SHIPPED | OUTPATIENT
Start: 2023-05-10

## 2023-05-10 RX ORDER — CLONAZEPAM 0.5 MG/1
0.5 TABLET ORAL 2 TIMES DAILY
Qty: 180 TABLET | Refills: 0 | Status: SHIPPED | OUTPATIENT
Start: 2023-05-10 | End: 2023-08-08

## 2023-05-10 RX ORDER — LEVOTHYROXINE SODIUM 88 MCG
88 TABLET ORAL DAILY
Qty: 90 TABLET | Refills: 3 | Status: SHIPPED | OUTPATIENT
Start: 2023-05-10

## 2023-05-10 RX ORDER — TRAZODONE HYDROCHLORIDE 50 MG/1
75 TABLET ORAL
Qty: 135 TABLET | Refills: 0 | Status: SHIPPED | OUTPATIENT
Start: 2023-05-10 | End: 2023-08-08

## 2023-05-10 NOTE — PROGRESS NOTES
Assessment/Plan: Recommend recheck again in 6 months  Patient seems to be doing well  Time spent counseling reviewing treatment plan coordinating care and documentation was 30 minutes  1  Encounter for screening mammogram for malignant neoplasm of breast  -     Mammo screening bilateral w 3d & cad; Future; Expected date: 05/10/2023    2  Recurrent major depressive disorder, in full remission ([facility] [address] )  -     lisdexamfetamine (Vyvanse) 70 MG capsule; Take 1 capsule (70 mg total) by mouth every morning Max Daily Amount: 70 mg  -     clonazePAM (KlonoPIN) 0 5 mg tablet; Take 1 tablet (0 5 mg total) by mouth 2 (two) times a day  -     traZODone (DESYREL) 50 mg tablet; Take 1 5 tablets (75 mg total) by mouth daily at bedtime    3  Chronic migraine without aura without status migrainosus, not intractable  Comments:  No increased frequency or severity of headaches  Continue current medication as needed  Orders:  -     traMADol (ULTRAM) 50 mg tablet; Take 1 tablet (50 mg total) by mouth every 6 (six) hours as needed for moderate pain  -     butalbital-aspirin-caffeine (Butalbital Compound/ASA) -40 MG per tablet; Take 1 tablet by mouth every 4 (four) hours as needed for headaches    4  Primary hypothyroidism  -     Synthroid 88 MCG tablet; Take 1 tablet (88 mcg total) by mouth daily  -     CBC and differential  -     Comprehensive metabolic panel  -     Lipid Panel with Direct LDL reflex  -     TSH, 3rd generation with Free T4 reflex; Future  -     Vitamin D 25 hydroxy; Future          Subjective:      Patient ID: Eusebia Torres is a 65 y o  female  Patient is seen for recheck on chronic medical conditions  She does need refills on medications  She is tolerating medications well      Medication Refill             The following portions of the patient's history were reviewed and updated as appropriate: allergies, current medications, past family history, past medical history, past social history, past surgical "history, and problem list     Review of Systems   Constitutional: Negative  HENT: Negative  Eyes: Negative  Respiratory: Negative  Cardiovascular: Negative  Gastrointestinal: Negative  Endocrine: Negative  Genitourinary: Negative  Musculoskeletal: Negative  Skin: Negative  Allergic/Immunologic: Negative  Neurological: Negative  Hematological: Negative  Psychiatric/Behavioral: Negative  Objective:      /84 (BP Location: Left arm, Patient Position: Sitting, Cuff Size: Standard)   Pulse 68   Temp (!) 97 °F (36 1 °C) (Tympanic)   Ht 5' 3\" (1 6 m)   Wt 64 9 kg (143 lb)   LMP 04/25/2016   SpO2 99%   BMI 25 33 kg/m²          Physical Exam  Vitals reviewed  Constitutional:       Appearance: She is well-developed  HENT:      Head: Normocephalic and atraumatic  Right Ear: External ear normal  Tympanic membrane is not erythematous or bulging  Left Ear: External ear normal  Tympanic membrane is not erythematous or bulging  Nose: Nose normal       Mouth/Throat:      Mouth: No oral lesions  Pharynx: No oropharyngeal exudate  Eyes:      General: No scleral icterus  Right eye: No discharge  Left eye: No discharge  Conjunctiva/sclera: Conjunctivae normal    Neck:      Thyroid: No thyromegaly  Cardiovascular:      Rate and Rhythm: Normal rate and regular rhythm  Heart sounds: Normal heart sounds  No murmur heard  No friction rub  No gallop  Pulmonary:      Effort: Pulmonary effort is normal  No respiratory distress  Breath sounds: No wheezing or rales  Chest:      Chest wall: No tenderness  Abdominal:      General: Bowel sounds are normal  There is no distension  Palpations: Abdomen is soft  There is no mass  Tenderness: There is no abdominal tenderness  There is no guarding or rebound  Musculoskeletal:         General: No tenderness or deformity  Normal range of motion        Cervical back: " Normal range of motion and neck supple  Lymphadenopathy:      Cervical: No cervical adenopathy  Skin:     General: Skin is warm and dry  Coloration: Skin is not pale  Findings: No erythema or rash  Neurological:      Mental Status: She is alert and oriented to person, place, and time  Cranial Nerves: No cranial nerve deficit  Motor: No abnormal muscle tone  Coordination: Coordination normal       Deep Tendon Reflexes: Reflexes are normal and symmetric     Psychiatric:         Behavior: Behavior normal

## 2023-06-06 ENCOUNTER — HOSPITAL ENCOUNTER (OUTPATIENT)
Dept: MAMMOGRAPHY | Facility: HOSPITAL | Age: 63
Discharge: HOME | End: 2023-06-06
Payer: COMMERCIAL

## 2023-06-06 VITALS — WEIGHT: 140 LBS | BODY MASS INDEX: 24.8 KG/M2 | HEIGHT: 63 IN

## 2023-06-06 DIAGNOSIS — Z12.31 ENCOUNTER FOR SCREENING MAMMOGRAM FOR MALIGNANT NEOPLASM OF BREAST: ICD-10-CM

## 2023-06-06 PROCEDURE — 77063 BREAST TOMOSYNTHESIS BI: CPT

## 2023-06-06 PROCEDURE — 77067 SCR MAMMO BI INCL CAD: CPT

## 2023-06-12 DIAGNOSIS — F33.42 RECURRENT MAJOR DEPRESSIVE DISORDER, IN FULL REMISSION (HCC): ICD-10-CM

## 2023-07-10 DIAGNOSIS — G43.709 CHRONIC MIGRAINE WITHOUT AURA WITHOUT STATUS MIGRAINOSUS, NOT INTRACTABLE: ICD-10-CM

## 2023-07-10 DIAGNOSIS — F33.42 RECURRENT MAJOR DEPRESSIVE DISORDER, IN FULL REMISSION (HCC): ICD-10-CM

## 2023-07-10 NOTE — TELEPHONE ENCOUNTER
lisdexamfetamine (Vyvanse) 70 MG capsule         Sig: Take 1 capsule (70 mg total) by mouth every morning Max Daily Amount: 70 mg    Disp:  30 capsule    Refills:  0    Start: 7/10/2023    Earliest Fill Date: 7/10/2023    Class: Normal    For: Recurrent major depressive disorder, in full remission ([address])    Last ordered: 4 weeks ago (6/12/2023) by Italia Bautista DO    Psychiatry:  Stimulants/ADHD Failed 07/10/2023 12:04 PM   Protocol Details  This refill cannot be delegated    Valid encounter within last 6 months       traMADol (ULTRAM) 50 mg tablet         Sig: Take 1 tablet (50 mg total) by mouth every 6 (six) hours as needed for moderate pain    Disp:  30 tablet    Refills:  0    Start: 7/10/2023    Class: Normal    For: Chronic migraine without aura without status migrainosus, not intractable    To pharmacy: This request is for a new prescription for a controlled substance as required by Federal/State law.     Last ordered: 2 months ago (5/10/2023) by Italia Bautista DO    Analgesics:  Opioid Agonists Failed 07/10/2023 12:04 PM   Protocol Details  This refill cannot be delegated    Valid encounter within last 3 months      To be filled at: [facility]/pharmacy #[id number]- [id number] [place] [address], [place] - [address]

## 2023-07-11 RX ORDER — TRAMADOL HYDROCHLORIDE 50 MG/1
50 TABLET ORAL EVERY 6 HOURS PRN
Qty: 30 TABLET | Refills: 0 | Status: SHIPPED | OUTPATIENT
Start: 2023-07-11

## 2023-08-11 DIAGNOSIS — F33.42 RECURRENT MAJOR DEPRESSIVE DISORDER, IN FULL REMISSION (HCC): ICD-10-CM

## 2023-08-15 NOTE — TELEPHONE ENCOUNTER
Requested medication(s) are due for refill today: Yes  Patient has already received a courtesy refill: No  Other reason request has been forwarded to provider: failed protocol    Psychiatry:  Stimulants/ADHD Failed 08/11/2023 11:11 AM   Protocol Details  This refill cannot be delegated    Valid encounter within last 6 months

## 2023-09-14 DIAGNOSIS — F33.42 RECURRENT MAJOR DEPRESSIVE DISORDER, IN FULL REMISSION (HCC): ICD-10-CM

## 2023-09-14 NOTE — TELEPHONE ENCOUNTER
clonazePAM (KlonoPIN) 0.5 mg tablet          Sig: Take 1 tablet (0.5 mg total) by mouth 2 (two) times a day    Disp:  180 tablet    Refills:  0    Start: 9/14/2023 - 12/13/2023    Class: Normal    Non-formulary For: Recurrent major depressive disorder, in full remission ([address])    To pharmacy: This request is for a new prescription for a controlled substance as required by Federal/State law.     Last ordered: 4 months ago (5/10/2023) by Kedar Schmidt DO    Psychiatry:  Anxiolytics/Hypnotics Failed 09/14/2023 01:15 PM   Protocol Details  This refill cannot be delegated    Valid encounter within last 6 months       traZODone (DESYREL) 50 mg tablet         Sig: Take 1.5 tablets (75 mg total) by mouth daily at bedtime    Disp:  135 tablet    Refills:  0    Start: 9/14/2023 - 12/13/2023    Class: Normal    Non-formulary For: Recurrent major depressive disorder, in full remission ([address])    Last ordered: 4 months ago (5/10/2023) by Kedar Schmidt DO    Psychiatry: Antidepressants - Serotonin Modulator Passed 09/14/2023 01:15 PM   Protocol Details  Valid encounter within last 6 months       lisdexamfetamine (Vyvanse) 70 MG capsule         Sig: Take 1 capsule (70 mg total) by mouth every morning Max Daily Amount: 70 mg    Disp:  30 capsule    Refills:  0    Start: 9/14/2023    Earliest Fill Date: 9/14/2023    Class: Normal    Non-formulary For: Recurrent major depressive disorder, in full remission ([address])    Last ordered: 4 weeks ago (8/17/2023) by Kedar Schmidt DO    Psychiatry:  Stimulants/ADHD Failed 09/14/2023 01:15 PM   Protocol Details  This refill cannot be delegated    Valid encounter within last 6 months      To be filled at: [facility]/pharmacy #[id number]- [id number] [facility] [address]

## 2023-09-15 RX ORDER — CLONAZEPAM 0.5 MG/1
0.5 TABLET ORAL 2 TIMES DAILY
Qty: 180 TABLET | Refills: 0 | Status: SHIPPED | OUTPATIENT
Start: 2023-09-15 | End: 2023-12-14

## 2023-09-15 RX ORDER — TRAZODONE HYDROCHLORIDE 50 MG/1
75 TABLET ORAL
Qty: 135 TABLET | Refills: 0 | Status: SHIPPED | OUTPATIENT
Start: 2023-09-15 | End: 2023-12-14

## 2023-09-15 RX ORDER — LISDEXAMFETAMINE DIMESYLATE CAPSULES 70 MG/1
70 CAPSULE ORAL EVERY MORNING
Qty: 30 CAPSULE | Refills: 0 | Status: SHIPPED | OUTPATIENT
Start: 2023-09-15

## 2023-09-22 DIAGNOSIS — F33.42 RECURRENT MAJOR DEPRESSIVE DISORDER, IN FULL REMISSION (HCC): ICD-10-CM

## 2023-09-22 NOTE — TELEPHONE ENCOUNTER
Received a call from the patient asking if we can send her Vyvanse prescription to [facility] order. [facility] cannot fill it for her. Also, they told her to ask for a 90 day supply, as that is cheaper. [facility] mail order    Thanks!

## 2023-09-26 RX ORDER — LISDEXAMFETAMINE DIMESYLATE CAPSULES 70 MG/1
70 CAPSULE ORAL EVERY MORNING
Qty: 90 CAPSULE | Refills: 0 | Status: SHIPPED | OUTPATIENT
Start: 2023-09-26

## 2023-10-02 DIAGNOSIS — G43.709 CHRONIC MIGRAINE WITHOUT AURA WITHOUT STATUS MIGRAINOSUS, NOT INTRACTABLE: ICD-10-CM

## 2023-10-02 NOTE — TELEPHONE ENCOUNTER
traMADol (ULTRAM) 50 mg tablet          Sig: Take 1 tablet (50 mg total) by mouth every 6 (six) hours as needed for moderate pain    Disp:  30 tablet    Refills:  0    Start: 10/2/2023    Class: Normal    Non-formulary For: Chronic migraine without aura without status migrainosus, not intractable    To pharmacy: This request is for a new prescription for a controlled substance as required by Federal/State law.     Last ordered: 2 months ago (7/11/2023) by Bia [place] DO    Analgesics:  Opioid Agonists Failed 10/02/2023 03:20 PM   Protocol Details  This refill cannot be delegated    Valid encounter within last 3 months      To be filled at: [id number]- [address]  [address]ANNE [address]AL[place] - [address]

## 2023-10-04 RX ORDER — TRAMADOL HYDROCHLORIDE 50 MG/1
50 TABLET ORAL EVERY 6 HOURS PRN
Qty: 30 TABLET | Refills: 0 | Status: SHIPPED | OUTPATIENT
Start: 2023-10-04

## 2023-10-28 DIAGNOSIS — Z78.0 MENOPAUSE: ICD-10-CM

## 2023-10-28 RX ORDER — ESTRADIOL 0.05 MG/D
1 PATCH TRANSDERMAL WEEKLY
Qty: 12 PATCH | Refills: 3 | Status: SHIPPED | OUTPATIENT
Start: 2023-10-28

## 2023-12-18 DIAGNOSIS — F33.42 RECURRENT MAJOR DEPRESSIVE DISORDER, IN FULL REMISSION (HCC): ICD-10-CM

## 2023-12-18 NOTE — TELEPHONE ENCOUNTER
Patient is due for 6 mo f/u and annual physical. Call placed to patient. No answer. Unable to leave a message

## 2023-12-18 NOTE — TELEPHONE ENCOUNTER
clonazePAM (KlonoPIN) 0.5 mg tablet          Sig: Take 1 tablet (0.5 mg total) by mouth 2 (two) times a day    Disp: 180 tablet    Refills: 0    Start: 12/18/2023 - 3/17/2024    Class: Normal    Non-formulary For: Recurrent major depressive disorder, in full remission (HCC)    To pharmacy: This request is for a new prescription for a controlled substance as required by Federal/State law.    Last ordered: 3 months ago (9/15/2023) by Hernando Magdaleno DO    Psychiatry:  Anxiolytics/Hypnotics Fpypos9012/18/2023 01:59 PM   Protocol Details This refill cannot be delegated    Valid encounter within last 6 months       traZODone (DESYREL) 50 mg tablet         Sig: Take 1.5 tablets (75 mg total) by mouth daily at bedtime    Disp: 135 tablet    Refills: 0    Start: 12/18/2023 - 3/17/2024    Class: Normal    Non-formulary For: Recurrent major depressive disorder, in full remission (HCC)    Last ordered: 3 months ago (9/15/2023) by Hernando Magdaleno DO    Psychiatry: Antidepressants - Serotonin Modulator [id number]/18/2023 01:59 PM   Protocol Details Valid encounter within last 6 months      To be filled at: [facility]/pharmacy #[id number] - [place], [place] - [address] [facility] [place]

## 2023-12-19 RX ORDER — CLONAZEPAM 0.5 MG/1
0.5 TABLET ORAL 2 TIMES DAILY
Qty: 180 TABLET | Refills: 0 | Status: SHIPPED | OUTPATIENT
Start: 2023-12-19 | End: 2024-03-18

## 2023-12-19 RX ORDER — TRAZODONE HYDROCHLORIDE 50 MG/1
75 TABLET ORAL
Qty: 135 TABLET | Refills: 0 | Status: SHIPPED | OUTPATIENT
Start: 2023-12-19 | End: 2024-03-18

## 2023-12-21 ENCOUNTER — OFFICE VISIT (OUTPATIENT)
Dept: FAMILY MEDICINE CLINIC | Facility: CLINIC | Age: 63
End: 2023-12-21
Payer: COMMERCIAL

## 2023-12-21 VITALS
BODY MASS INDEX: 25.87 KG/M2 | HEART RATE: 78 BPM | TEMPERATURE: 97.3 F | WEIGHT: 146 LBS | DIASTOLIC BLOOD PRESSURE: 66 MMHG | OXYGEN SATURATION: 96 % | HEIGHT: 63 IN | SYSTOLIC BLOOD PRESSURE: 128 MMHG

## 2023-12-21 DIAGNOSIS — F98.8 ATTENTION DEFICIT DISORDER (ADD) WITHOUT HYPERACTIVITY: ICD-10-CM

## 2023-12-21 DIAGNOSIS — Z00.00 WELL ADULT EXAM: Primary | ICD-10-CM

## 2023-12-21 DIAGNOSIS — F33.42 RECURRENT MAJOR DEPRESSIVE DISORDER, IN FULL REMISSION (HCC): ICD-10-CM

## 2023-12-21 PROCEDURE — 99396 PREV VISIT EST AGE 40-64: CPT | Performed by: FAMILY MEDICINE

## 2023-12-21 RX ORDER — LISDEXAMFETAMINE DIMESYLATE CAPSULES 70 MG/1
70 CAPSULE ORAL EVERY MORNING
Qty: 90 CAPSULE | Refills: 0 | Status: SHIPPED | OUTPATIENT
Start: 2023-12-21

## 2023-12-21 NOTE — PROGRESS NOTES
"Assessment/Plan: Anticipatory guidance provided.  Recommend recheck in 6 to 12 months.  Continue Vyvanse.  She tolerates this well.     1. Well adult exam    2. Recurrent major depressive disorder, in full remission (HCC)  -     lisdexamfetamine (Vyvanse) 70 MG capsule; Take 1 capsule (70 mg total) by mouth every morning Max Daily Amount: 70 mg    3. Attention deficit disorder (ADD) without hyperactivity          Subjective:      Patient ID: Deysi Franks is a 63 y.o. female.    Patient is seen for well check.  Generally feeling well.  She does well on Vyvanse.             The following portions of the patient's history were reviewed and updated as appropriate: allergies, current medications, past family history, past medical history, past social history, past surgical history, and problem list.    Review of Systems   Constitutional: Negative.    HENT: Negative.     Eyes: Negative.    Respiratory: Negative.     Cardiovascular: Negative.    Gastrointestinal: Negative.    Endocrine: Negative.    Genitourinary: Negative.    Musculoskeletal: Negative.    Skin: Negative.    Allergic/Immunologic: Negative.    Neurological: Negative.    Hematological: Negative.    Psychiatric/Behavioral: Negative.           Objective:      /66 (BP Location: Left arm, Patient Position: Sitting, Cuff Size: Standard)   Pulse 78   Temp (!) 97.3 °F (36.3 °C) (Tympanic)   Ht 5' 3\" (1.6 m)   Wt 66.2 kg (146 lb)   LMP 04/25/2016   SpO2 96%   BMI 25.86 kg/m²          Physical Exam  Vitals reviewed.   Constitutional:       Appearance: She is well-developed.   HENT:      Head: Normocephalic and atraumatic.      Right Ear: External ear normal. Tympanic membrane is not erythematous or bulging.      Left Ear: External ear normal. Tympanic membrane is not erythematous or bulging.      Nose: Nose normal.      Mouth/Throat:      Mouth: No oral lesions.      Pharynx: No oropharyngeal exudate.   Eyes:      General: No scleral icterus.        " Right eye: No discharge.         Left eye: No discharge.      Conjunctiva/sclera: Conjunctivae normal.   Neck:      Thyroid: No thyromegaly.   Cardiovascular:      Rate and Rhythm: Normal rate and regular rhythm.      Heart sounds: Normal heart sounds. No murmur heard.     No friction rub. No gallop.   Pulmonary:      Effort: Pulmonary effort is normal. No respiratory distress.      Breath sounds: No wheezing or rales.   Chest:      Chest wall: No tenderness.   Abdominal:      General: Bowel sounds are normal. There is no distension.      Palpations: Abdomen is soft. There is no mass.      Tenderness: There is no abdominal tenderness. There is no guarding or rebound.   Musculoskeletal:         General: No tenderness or deformity. Normal range of motion.      Cervical back: Normal range of motion and neck supple.   Lymphadenopathy:      Cervical: No cervical adenopathy.   Skin:     General: Skin is warm and dry.      Coloration: Skin is not pale.      Findings: No erythema or rash.   Neurological:      Mental Status: She is alert and oriented to person, place, and time.      Cranial Nerves: No cranial nerve deficit.      Motor: No abnormal muscle tone.      Coordination: Coordination normal.      Deep Tendon Reflexes: Reflexes are normal and symmetric.   Psychiatric:         Behavior: Behavior normal.

## 2024-01-15 DIAGNOSIS — G43.709 CHRONIC MIGRAINE WITHOUT AURA WITHOUT STATUS MIGRAINOSUS, NOT INTRACTABLE: ICD-10-CM

## 2024-01-15 RX ORDER — TRAMADOL HYDROCHLORIDE 50 MG/1
50 TABLET ORAL EVERY 6 HOURS PRN
Qty: 30 TABLET | Refills: 0 | Status: SHIPPED | OUTPATIENT
Start: 2024-01-15

## 2024-01-15 NOTE — TELEPHONE ENCOUNTER
traMADol (ULTRAM) 50 mg tablet         Sig: Take 1 tablet (50 mg total) by mouth every 6 (six) hours as needed for moderate pain    Disp: 30 tablet    Refills: 0    Start: 1/15/2024    Class: Normal    Non-formulary For: Chronic migraine without aura without status migrainosus, not intractable    To pharmacy: This request is for a new prescription for a controlled substance as required by Federal/State law.    Last ordered: 3 months ago (10/4/2023) by Hernando Magdaleno DO    Analgesics:  Opioid Agonists Mgefcc42/15/2024 03:05 PM   Protocol Details This refill cannot be delegated    Valid encounter within last 3 months      To be filled at: [facility]/pharmacy #[id number] - [place], [place] - [address] [facility] [place]

## 2024-03-20 ENCOUNTER — TELEPHONE (OUTPATIENT)
Age: 64
End: 2024-03-20

## 2024-03-22 DIAGNOSIS — F33.42 RECURRENT MAJOR DEPRESSIVE DISORDER, IN FULL REMISSION (HCC): ICD-10-CM

## 2024-03-22 RX ORDER — LISDEXAMFETAMINE DIMESYLATE CAPSULES 70 MG/1
70 CAPSULE ORAL EVERY MORNING
Qty: 90 CAPSULE | Refills: 0 | Status: SHIPPED | OUTPATIENT
Start: 2024-03-22

## 2024-03-22 NOTE — TELEPHONE ENCOUNTER
Medication was last prescribed in December 2023.  This was prior to the Go Live date of 1/9.  If medication needs a prior authorization this will need to be done in office.

## 2024-03-25 NOTE — TELEPHONE ENCOUNTER
PA for lisdexamfetamine (Vyvanse) 70 MG capsule     Submitted via    [x]CMM-KEY [id number]  []Surescripts-Case ID #   []Faxed to plan   []Other website   []Phone call Case ID #     Office notes sent, clinical questions answered. Awaiting determination    Turnaround time for your insurance to make a decision on your Prior Authorization can take 7-21 business days.

## 2024-03-29 DIAGNOSIS — G43.709 CHRONIC MIGRAINE WITHOUT AURA WITHOUT STATUS MIGRAINOSUS, NOT INTRACTABLE: ICD-10-CM

## 2024-03-29 DIAGNOSIS — F33.42 RECURRENT MAJOR DEPRESSIVE DISORDER, IN FULL REMISSION (HCC): ICD-10-CM

## 2024-03-29 RX ORDER — TRAZODONE HYDROCHLORIDE 50 MG/1
75 TABLET ORAL
Qty: 135 TABLET | Refills: 1 | Status: SHIPPED | OUTPATIENT
Start: 2024-03-29 | End: 2024-06-27

## 2024-03-29 NOTE — TELEPHONE ENCOUNTER
traMADol (ULTRAM) 50 mg tablet          Possible duplicate: Hover to review recent actions on this medication    Sig: Take 1 tablet (50 mg total) by mouth every 6 (six) hours as needed for moderate pain    Disp: 30 tablet    Refills: 0    Start: 3/29/2024    Class: Normal    For: Chronic migraine without aura without status migrainosus, not intractable    To pharmacy: This request is for a new prescription for a controlled substance as required by Federal/State law.    Last ordered: 2 months ago (1/15/2024) by Hernando Magdaleno DO    Pharmacy: [id number] - [place] - [address] [facility] [facility] [place]    Analgesics:  Opioid Agonists Oeuoso2203/29/2024 12:39 PM   Protocol Details This refill cannot be delegated    Valid encounter within last 3 months       clonazePAM (KlonoPIN) 0.5 mg tablet          Possible duplicate: Hover to review recent actions on this medication    Sig: Take 1 tablet (0.5 mg total) by mouth 2 (two) times a day    Disp: 180 tablet    Refills: 0    Start: 3/29/2024 - 6/27/2024    Class: Normal    For: Recurrent major depressive disorder, in full remission (HCC)    To pharmacy: This request is for a new prescription for a controlled substance as required by Federal/State law.    Last ordered: 3 months ago (12/19/2023) by Hernando Magdaleno DO    Pharmacy: [email or web address] - [place], [place] - [address] [facility] [facility]    Psychiatry:  Anxiolytics/Hypnotics Pxvkzj7403/29/2024 12:39 PM   Protocol Details This refill cannot be delegated    Valid encounter within last 6 months      To be filled at: [facility] (see each order)

## 2024-03-29 NOTE — TELEPHONE ENCOUNTER
Reason for call:   [x] Refill   [] Prior Auth  [] Other:     Office:   [x] PCP/Provider - Hernando Magdaleno,   PCP - General  [] Specialty/Provider -     Medication:     clonazePAM (KlonoPIN) 0.5 mg tablet - two daily# 180     traMADol (ULTRAM) 50 mg tablet - 1 tab every 6 hrs prn # 30    traZODone (DESYREL) 50 mg tablet - 1 .5 tab every night at bedtime # 135    [facility] - [place], [place]  [address] [facility]    Does the patient have enough for 3 days?   [x] Yes   [] No - Send as HP to POD

## 2024-04-01 RX ORDER — CLONAZEPAM 0.5 MG/1
0.5 TABLET ORAL 2 TIMES DAILY
Qty: 180 TABLET | Refills: 0 | Status: SHIPPED | OUTPATIENT
Start: 2024-04-01 | End: 2024-06-30

## 2024-04-01 RX ORDER — TRAMADOL HYDROCHLORIDE 50 MG/1
50 TABLET ORAL EVERY 6 HOURS PRN
Qty: 30 TABLET | Refills: 0 | Status: SHIPPED | OUTPATIENT
Start: 2024-04-01

## 2024-04-16 DIAGNOSIS — E03.9 PRIMARY HYPOTHYROIDISM: ICD-10-CM

## 2024-04-16 RX ORDER — LEVOTHYROXINE SODIUM 88 MCG
88 TABLET ORAL DAILY
Qty: 90 TABLET | Refills: 0 | Status: SHIPPED | OUTPATIENT
Start: 2024-04-16

## 2024-05-10 DIAGNOSIS — F33.42 RECURRENT MAJOR DEPRESSIVE DISORDER, IN FULL REMISSION (HCC): ICD-10-CM

## 2024-05-13 RX ORDER — LISDEXAMFETAMINE DIMESYLATE 70 MG/1
70 CAPSULE ORAL EVERY MORNING
Qty: 90 CAPSULE | Refills: 0 | Status: SHIPPED | OUTPATIENT
Start: 2024-05-13

## 2024-05-13 NOTE — TELEPHONE ENCOUNTER
lisdexamfetamine (Vyvanse) 70 MG capsule         Sig: Take 1 capsule (70 mg total) by mouth every morning Max Daily Amount: 70 mg    Disp: 90 capsule    Refills: 0    Start: 5/10/2024    Earliest Fill Date: 5/10/2024    Class: Normal    Non-formulary For: Recurrent major depressive disorder, in full remission (HCC)    Last ordered: 1 month ago (3/22/2024) by Hernando Magdaleno DO    Patient comment: my insurance has changed. I no longer have [facility]. Unfortunately, the pre-authorization for Vyvanse was never resolved. My new Mailorder pharmacy also does not have the generic version in stock.  I will need a new prescription for Vyvanse sent to my new Mailorder pharmacy with a pre-authorization. The Mailorder pharmacy now is called:  [facility], Fax# [phone].  please contact me with any questions at [phone].  Thank you    Psychiatry:  Stimulants/ADHD Hutnig04/10/2024 03:00 PM   Protocol Details This refill cannot be delegated    Valid encounter within last 6 months      To be filled at: [facility] - [place] - [address]

## 2024-05-15 ENCOUNTER — TELEPHONE (OUTPATIENT)
Age: 64
End: 2024-05-15

## 2024-05-15 DIAGNOSIS — F33.42 RECURRENT MAJOR DEPRESSIVE DISORDER, IN FULL REMISSION (HCC): ICD-10-CM

## 2024-05-15 RX ORDER — LISDEXAMFETAMINE DIMESYLATE 70 MG/1
70 CAPSULE ORAL EVERY MORNING
Qty: 90 CAPSULE | Refills: 0 | Status: CANCELLED | OUTPATIENT
Start: 2024-05-15

## 2024-05-21 NOTE — TELEPHONE ENCOUNTER
PA for Vyvanse Brand name Approved     Date(s) approved 05/21/2024-05/21/2025    Case #    Patient advised by          [x] Amoobit Message  [] Phone call   []LMOM  []L/M to call office as no active Communication consent on file  []Unable to leave detailed message as VM not approved on Communication consent       Pharmacy advised by    []Fax  [x]Phone call    Approval letter scanned into Media No

## 2024-05-21 NOTE — TELEPHONE ENCOUNTER
PA for Vyvanse 70mg BRAND NAME ONLY    Submitted via    [x]CMM-KEY [id number]  []SurescriTradeo-Case ID #   []Faxed to plan   []Other website   []Phone call Case ID #     Office notes sent, clinical questions answered. Awaiting determination    Turnaround time for your insurance to make a decision on your Prior Authorization can take 7-21 business days.

## 2024-05-31 DIAGNOSIS — Z78.0 MENOPAUSE: ICD-10-CM

## 2024-05-31 DIAGNOSIS — G43.709 CHRONIC MIGRAINE WITHOUT AURA WITHOUT STATUS MIGRAINOSUS, NOT INTRACTABLE: ICD-10-CM

## 2024-05-31 NOTE — TELEPHONE ENCOUNTER
Caller: Deysi    Reason for call: Patient is calling since her medication has been approved she needs to script to be faxed to specialty pharmacy mail order [facility]    Fax: [phone]    Call back#: [phone]

## 2024-06-01 RX ORDER — ESTRADIOL 0.05 MG/D
1 PATCH TRANSDERMAL WEEKLY
Qty: 12 PATCH | Refills: 1 | Status: SHIPPED | OUTPATIENT
Start: 2024-06-01

## 2024-06-03 ENCOUNTER — TELEPHONE (OUTPATIENT)
Age: 64
End: 2024-06-03

## 2024-06-03 RX ORDER — TRAMADOL HYDROCHLORIDE 50 MG/1
50 TABLET ORAL EVERY 6 HOURS PRN
Qty: 30 TABLET | Refills: 0 | Status: SHIPPED | OUTPATIENT
Start: 2024-06-03

## 2024-06-03 NOTE — TELEPHONE ENCOUNTER
traMADol (ULTRAM) 50 mg tablet         Sig: Take 1 tablet (50 mg total) by mouth every 6 (six) hours as needed for moderate pain    Disp: 30 tablet    Refills: 0    Start: 6/1/2024    Class: Normal    PDMP Needs Review    For: Chronic migraine without aura without status migrainosus, not intractable    To pharmacy: This request is for a new prescription for a controlled substance as required by Federal/State law.    Last ordered: 2 months ago (4/1/2024) by Hernando Magdaleno DO    Prior authorization request will be sent when the order is signed.    Patient comment: Please send prescription to retail pharmacy: [facility] in [place]    Analgesics:  Opioid Agonists Hfmpss1106/01/2024 08:13 AM   Protocol Details This refill cannot be delegated    Valid encounter within last 3 months      To be filled at: [facility] [address]

## 2024-06-03 NOTE — TELEPHONE ENCOUNTER
[facility] request patients insurance information to bill for   traMADol (ULTRAM) 50 mg tablet [[id number]] . Please advise [facility] at [phone]  , if any further questions.

## 2024-06-04 ENCOUNTER — TELEPHONE (OUTPATIENT)
Age: 64
End: 2024-06-04

## 2024-06-04 DIAGNOSIS — F33.42 RECURRENT MAJOR DEPRESSIVE DISORDER, IN FULL REMISSION (HCC): ICD-10-CM

## 2024-06-04 RX ORDER — LISDEXAMFETAMINE DIMESYLATE 70 MG/1
70 CAPSULE ORAL EVERY MORNING
Qty: 90 CAPSULE | Refills: 0 | Status: SHIPPED | OUTPATIENT
Start: 2024-06-04

## 2024-06-04 NOTE — TELEPHONE ENCOUNTER
PA for Tramadol 50mg    Submitted via    []CMM-KEY   [x][employer]-Case ID # : [id number]  []Faxed to plan   []Other website   []Phone call Case ID #     Office notes sent, clinical questions answered. Awaiting determination    Turnaround time for your insurance to make a decision on your Prior Authorization can take 7-21 business days.

## 2024-06-06 NOTE — TELEPHONE ENCOUNTER
Milena from [facility] reports that patient's Tramadol was denied due to medical necessity. Reference number [id number]. 180 days to appeal.

## 2024-06-06 NOTE — TELEPHONE ENCOUNTER
PA for TRAMADOL Denied    Reason:        Message sent to office clinical pool Yes    Denial letter scanned into Media Yes    Appeal started No ( Provider will need to decide if appeal is warranted and send clinical documentation to PA team for initiation.)    **Please follow up with your patient regarding denial and next steps**

## 2024-06-25 DIAGNOSIS — E03.9 PRIMARY HYPOTHYROIDISM: ICD-10-CM

## 2024-06-25 DIAGNOSIS — F33.42 RECURRENT MAJOR DEPRESSIVE DISORDER, IN FULL REMISSION (HCC): ICD-10-CM

## 2024-06-25 DIAGNOSIS — Z12.31 ENCOUNTER FOR SCREENING MAMMOGRAM FOR MALIGNANT NEOPLASM OF BREAST: Primary | ICD-10-CM

## 2024-06-26 RX ORDER — LEVOTHYROXINE SODIUM 88 MCG
88 TABLET ORAL DAILY
Qty: 30 TABLET | Refills: 0 | Status: SHIPPED | OUTPATIENT
Start: 2024-06-26

## 2024-06-27 RX ORDER — CLONAZEPAM 0.5 MG/1
0.5 TABLET ORAL 2 TIMES DAILY
Qty: 180 TABLET | Refills: 0 | OUTPATIENT
Start: 2024-06-27 | End: 2024-09-25

## 2024-06-28 ENCOUNTER — HOSPITAL ENCOUNTER (OUTPATIENT)
Dept: MAMMOGRAPHY | Facility: HOSPITAL | Age: 64
End: 2024-06-28
Payer: COMMERCIAL

## 2024-06-28 DIAGNOSIS — Z12.31 ENCOUNTER FOR SCREENING MAMMOGRAM FOR MALIGNANT NEOPLASM OF BREAST: ICD-10-CM

## 2024-06-28 PROCEDURE — 77063 BREAST TOMOSYNTHESIS BI: CPT

## 2024-06-28 PROCEDURE — 77067 SCR MAMMO BI INCL CAD: CPT

## 2024-07-22 ENCOUNTER — OFFICE VISIT (OUTPATIENT)
Dept: FAMILY MEDICINE CLINIC | Facility: CLINIC | Age: 64
End: 2024-07-22
Payer: COMMERCIAL

## 2024-07-22 VITALS
HEART RATE: 68 BPM | OXYGEN SATURATION: 96 % | TEMPERATURE: 97.5 F | DIASTOLIC BLOOD PRESSURE: 74 MMHG | WEIGHT: 144.8 LBS | SYSTOLIC BLOOD PRESSURE: 122 MMHG | HEIGHT: 63 IN | BODY MASS INDEX: 25.66 KG/M2

## 2024-07-22 DIAGNOSIS — F11.20 CONTINUOUS OPIOID DEPENDENCE (HCC): ICD-10-CM

## 2024-07-22 DIAGNOSIS — E03.9 PRIMARY HYPOTHYROIDISM: Primary | ICD-10-CM

## 2024-07-22 PROCEDURE — 99214 OFFICE O/P EST MOD 30 MIN: CPT | Performed by: FAMILY MEDICINE

## 2024-07-22 RX ORDER — LEVOTHYROXINE SODIUM 88 MCG
88 TABLET ORAL DAILY
Qty: 90 TABLET | Refills: 3 | Status: SHIPPED | OUTPATIENT
Start: 2024-07-22

## 2024-07-22 NOTE — PROGRESS NOTES
"Assessment/Plan: Recommend lab testing below.  Recommend return to office for recheck in 3 to 6 months.  Adjust thyroid medication as needed.     1. Primary hypothyroidism  -     CBC and differential  -     Comprehensive metabolic panel  -     TSH, 3rd generation with Free T4 reflex; Future  -     Lipid Panel with Direct LDL reflex  -     TSH, 3rd generation with Free T4 reflex  -     Synthroid 88 MCG tablet; Take 1 tablet (88 mcg total) by mouth daily Ok to substitute generic if BRAND not available.  2. Continuous opioid dependence (HCC)        Subjective:      Patient ID: Deysi Franks is a 64 y.o. female.    Patient here for recheck on chronic conditions.  She is due for lab testing recheck.  She does need refill on her Synthroid.  She is generally feeling well.             The following portions of the patient's history were reviewed and updated as appropriate: allergies, current medications, past family history, past medical history, past social history, past surgical history, and problem list.    Review of Systems   Constitutional: Negative.    HENT: Negative.     Eyes: Negative.    Respiratory: Negative.     Cardiovascular: Negative.    Gastrointestinal: Negative.    Endocrine: Negative.    Genitourinary: Negative.    Musculoskeletal: Negative.    Skin: Negative.    Allergic/Immunologic: Negative.    Neurological: Negative.    Hematological: Negative.    Psychiatric/Behavioral: Negative.           Objective:      /74 (BP Location: Left arm, Patient Position: Sitting, Cuff Size: Standard)   Pulse 68   Temp 97.5 °F (36.4 °C)   Ht 5' 3\" (1.6 m)   Wt 65.7 kg (144 lb 12.8 oz)   LMP 04/25/2016   SpO2 96%   BMI 25.65 kg/m²          Physical Exam  Vitals reviewed.   Constitutional:       Appearance: She is well-developed.   HENT:      Head: Normocephalic and atraumatic.      Right Ear: External ear normal. Tympanic membrane is not erythematous or bulging.      Left Ear: External ear normal. Tympanic " membrane is not erythematous or bulging.      Nose: Nose normal.      Mouth/Throat:      Mouth: No oral lesions.      Pharynx: No oropharyngeal exudate.   Eyes:      General: No scleral icterus.        Right eye: No discharge.         Left eye: No discharge.      Conjunctiva/sclera: Conjunctivae normal.   Neck:      Thyroid: No thyromegaly.   Cardiovascular:      Rate and Rhythm: Normal rate and regular rhythm.      Heart sounds: Normal heart sounds. No murmur heard.     No friction rub. No gallop.   Pulmonary:      Effort: Pulmonary effort is normal. No respiratory distress.      Breath sounds: No wheezing or rales.   Chest:      Chest wall: No tenderness.   Abdominal:      General: Bowel sounds are normal. There is no distension.      Palpations: Abdomen is soft. There is no mass.      Tenderness: There is no abdominal tenderness. There is no guarding or rebound.   Musculoskeletal:         General: No tenderness or deformity. Normal range of motion.      Cervical back: Normal range of motion and neck supple.   Lymphadenopathy:      Cervical: No cervical adenopathy.   Skin:     General: Skin is warm and dry.      Coloration: Skin is not pale.      Findings: No erythema or rash.   Neurological:      Mental Status: She is alert and oriented to person, place, and time.      Cranial Nerves: No cranial nerve deficit.      Motor: No abnormal muscle tone.      Coordination: Coordination normal.      Deep Tendon Reflexes: Reflexes are normal and symmetric.   Psychiatric:         Behavior: Behavior normal.

## 2024-07-23 ENCOUNTER — TELEPHONE (OUTPATIENT)
Age: 64
End: 2024-07-23

## 2024-07-23 DIAGNOSIS — F33.42 RECURRENT MAJOR DEPRESSIVE DISORDER, IN FULL REMISSION (HCC): ICD-10-CM

## 2024-07-23 NOTE — TELEPHONE ENCOUNTER
Patient states she needs her Klonopin resent to the pharmacy. The pharmacy never received it. It needs to be the Generic or insurance will not cover it.

## 2024-07-24 DIAGNOSIS — F33.42 RECURRENT MAJOR DEPRESSIVE DISORDER, IN FULL REMISSION (HCC): ICD-10-CM

## 2024-07-24 RX ORDER — CLONAZEPAM 0.5 MG/1
0.5 TABLET ORAL 2 TIMES DAILY
Qty: 180 TABLET | Refills: 0 | Status: SHIPPED | OUTPATIENT
Start: 2024-07-24 | End: 2024-07-30 | Stop reason: SDUPTHER

## 2024-07-24 NOTE — TELEPHONE ENCOUNTER
Patient contacted the office back returning call. Requesting the clonazePAM (KlonoPIN) 0.5 mg tablet be resent to [facility], 3 month supply as it is a mail order. Please advise.

## 2024-07-30 DIAGNOSIS — F33.42 RECURRENT MAJOR DEPRESSIVE DISORDER, IN FULL REMISSION (HCC): ICD-10-CM

## 2024-07-30 RX ORDER — CLONAZEPAM 0.5 MG/1
0.5 TABLET ORAL 2 TIMES DAILY
Qty: 10 TABLET | Refills: 0 | Status: SHIPPED | OUTPATIENT
Start: 2024-07-30 | End: 2024-08-04

## 2024-08-01 LAB
ALBUMIN SERPL-MCNC: 4.1 G/DL (ref 3.5–5.7)
ALP SERPL-CCNC: 37 U/L (ref 35–120)
ALT SERPL-CCNC: 11 U/L
ANION GAP SERPL CALCULATED.3IONS-SCNC: 8 MMOL/L (ref 3–11)
AST SERPL-CCNC: 12 U/L
BASOPHILS # BLD AUTO: 0 THOU/CMM (ref 0–0.1)
BASOPHILS NFR BLD AUTO: 0 %
BILIRUB SERPL-MCNC: 0.7 MG/DL (ref 0.2–1)
BUN SERPL-MCNC: 12 MG/DL (ref 7–25)
CALCIUM SERPL-MCNC: 9.2 MG/DL (ref 8.5–10.1)
CHLORIDE SERPL-SCNC: 103 MMOL/L (ref 100–109)
CHOLEST SERPL-MCNC: 169 MG/DL
CHOLEST/HDLC SERPL: 2.5 {RATIO}
CO2 SERPL-SCNC: 28 MMOL/L (ref 21–31)
CREAT SERPL-MCNC: 0.82 MG/DL (ref 0.4–1.1)
CYTOLOGY CMNT CVX/VAG CYTO-IMP: NORMAL
DIFFERENTIAL METHOD BLD: NORMAL
EOSINOPHIL # BLD AUTO: 0.1 THOU/CMM (ref 0–0.5)
EOSINOPHIL NFR BLD AUTO: 1 %
ERYTHROCYTE [DISTWIDTH] IN BLOOD BY AUTOMATED COUNT: 13.9 % (ref 12–16)
GFR/BSA.PRED SERPLBLD CYS-BASED-ARV: 80 ML/MIN/{1.73_M2}
GLUCOSE SERPL-MCNC: 88 MG/DL (ref 65–99)
HCT VFR BLD AUTO: 41.9 % (ref 35–43)
HDLC SERPL-MCNC: 67 MG/DL (ref 23–92)
HGB BLD-MCNC: 13.9 G/DL (ref 11.5–14.5)
LDLC SERPL CALC-MCNC: 92 MG/DL
LYMPHOCYTES # BLD AUTO: 1 THOU/CMM (ref 1–3)
LYMPHOCYTES NFR BLD AUTO: 19 %
MCH RBC QN AUTO: 30.4 PG (ref 26–34)
MCHC RBC AUTO-ENTMCNC: 33.2 G/DL (ref 32–37)
MCV RBC AUTO: 91 FL (ref 80–100)
MONOCYTES # BLD AUTO: 0.5 THOU/CMM (ref 0.3–1)
MONOCYTES NFR BLD AUTO: 8 %
NEUTROPHILS # BLD AUTO: 4 THOU/CMM (ref 1.8–7.8)
NEUTROPHILS NFR BLD AUTO: 72 %
NONHDLC SERPL-MCNC: 102 MG/DL
PLATELET # BLD AUTO: 171 THOU/CMM (ref 140–350)
PMV BLD REES-ECKER: 10.8 FL (ref 7.5–11.3)
POTASSIUM SERPL-SCNC: 4.1 MMOL/L (ref 3.5–5.2)
PROT SERPL-MCNC: 6.6 G/DL (ref 6.3–8.3)
RBC # BLD AUTO: 4.59 MILL/CMM (ref 3.7–4.7)
SODIUM SERPL-SCNC: 139 MMOL/L (ref 135–145)
TRIGL SERPL-MCNC: 48 MG/DL
TSH SERPL-ACNC: 1.28 UIU/ML (ref 0.45–5.33)
WBC # BLD AUTO: 5.5 THOU/CMM (ref 4–10)

## 2024-09-02 NOTE — TELEPHONE ENCOUNTER
Airway  Date/Time: 8/27/2024 3:21 PM  Urgency: elective      Staffing  Performed: CRNA   Authorized by: Altaf Ayala MD    Performed by: Altaf Ayala MD  Patient location during procedure: OR    Final Airway Details  Final airway type: endotracheal airway                 Patient request for thyroid script she has a temporary address: care of [employer] [place] [address] [place] [address]   If we can mail her this script when ordered

## 2024-09-03 DIAGNOSIS — F33.42 RECURRENT MAJOR DEPRESSIVE DISORDER, IN FULL REMISSION (HCC): ICD-10-CM

## 2024-09-03 DIAGNOSIS — G43.709 CHRONIC MIGRAINE WITHOUT AURA WITHOUT STATUS MIGRAINOSUS, NOT INTRACTABLE: ICD-10-CM

## 2024-09-03 NOTE — TELEPHONE ENCOUNTER
Reason for call:   [x] Refill   [] Prior Auth  [] Other:     Office:   [x] PCP/Provider -   [] Specialty/Provider -     Medication: Lisdexamfetamine 70 mg, take 1 capsule by mouth every morning                        Tramadol 50 mg, take 1 tablet by mouth every 6 hours as needed                         Trazodone 50 mg, take 1.5 tablets by mouth daily at bedtime       Pharmacy: [facility]    Does the patient have enough for 3 days?   [x] Yes   [] No - Send as HP to POD

## 2024-09-04 RX ORDER — TRAZODONE HYDROCHLORIDE 50 MG/1
75 TABLET, FILM COATED ORAL
Qty: 135 TABLET | Refills: 1 | Status: SHIPPED | OUTPATIENT
Start: 2024-09-04 | End: 2025-03-03

## 2024-09-04 NOTE — TELEPHONE ENCOUNTER
lisdexamfetamine (Vyvanse) 70 MG capsule         Sig: Take 1 capsule (70 mg total) by mouth every morning Max Daily Amount: 70 mg    Disp: 90 capsule    Refills: 1    Start: 9/4/2024    Earliest Fill Date: 9/4/2024    Class: Normal    PDMP Needs Review    For: Recurrent major depressive disorder, in full remission (HCC)    Last ordered: 3 months ago (6/4/2024) by Hernando Magdaleno DO    Psychiatry:  Stimulants/ADHD Slvmon0509/03/2024 01:20 PM   Protocol Details This refill cannot be delegated    Valid encounter within last 6 months       traMADol (ULTRAM) 50 mg tablet         Sig: Take 1 tablet (50 mg total) by mouth every 6 (six) hours as needed for moderate pain    Disp: 30 tablet    Refills: 0    Start: 9/4/2024    Class: Normal    PDMP Needs Review    For: Chronic migraine without aura without status migrainosus, not intractable    To pharmacy: This request is for a new prescription for a controlled substance as required by Federal/State law.    Last ordered: 3 months ago (6/3/2024) by Hernando Magdaleno DO    Prior authorization request will be sent when the order is signed.    Analgesics:  Opioid Agonists Pjvueh4709/03/2024 01:20 PM   Protocol Details This refill cannot be delegated    Valid encounter within last 3 months      To be filled at: [facility] [address]

## 2024-09-05 RX ORDER — TRAMADOL HYDROCHLORIDE 50 MG/1
50 TABLET ORAL EVERY 6 HOURS PRN
Qty: 30 TABLET | Refills: 0 | Status: SHIPPED | OUTPATIENT
Start: 2024-09-05

## 2024-09-05 RX ORDER — LISDEXAMFETAMINE DIMESYLATE 70 MG/1
70 CAPSULE ORAL EVERY MORNING
Qty: 90 CAPSULE | Refills: 0 | Status: SHIPPED | OUTPATIENT
Start: 2024-09-05

## 2024-09-05 NOTE — TELEPHONE ENCOUNTER
PA for traMADol (ULTRAM) 50 mg tablet SUBMITTED     via    []CMM-KEY:    [x][employer]el-Case ID # [id number]   []Faxed to plan   []Other website    []Phone call Case ID #      Office notes sent, clinical questions answered. Awaiting determination    Turnaround time for your insurance to make a decision on your Prior Authorization can take 7-21 business days.

## 2024-09-06 ENCOUNTER — TELEPHONE (OUTPATIENT)
Age: 64
End: 2024-09-06

## 2024-09-06 NOTE — TELEPHONE ENCOUNTER
PA for traMADol (ULTRAM) 50 mg tablet  APPROVED     Date(s) approved 09/06/2024-12/05/2024    Case #[id number]    Patient advised by          []Sparkcentralhart Message  []Phone call   [x]LMOM  []L/M to call office as no active Communication consent on file  []Unable to leave detailed message as VM not approved on Communication consent       Pharmacy advised by    [x]Fax  []Phone call    Approval letter scanned into Media No

## 2024-10-15 ENCOUNTER — TELEPHONE (OUTPATIENT)
Age: 64
End: 2024-10-15

## 2024-10-15 ENCOUNTER — PATIENT MESSAGE (OUTPATIENT)
Dept: FAMILY MEDICINE CLINIC | Facility: CLINIC | Age: 64
End: 2024-10-15

## 2024-10-15 DIAGNOSIS — F33.42 RECURRENT MAJOR DEPRESSIVE DISORDER, IN FULL REMISSION (HCC): ICD-10-CM

## 2024-10-15 DIAGNOSIS — E03.9 PRIMARY HYPOTHYROIDISM: ICD-10-CM

## 2024-10-16 RX ORDER — LEVOTHYROXINE SODIUM 88 MCG
88 TABLET ORAL DAILY
Qty: 90 TABLET | Refills: 0 | OUTPATIENT
Start: 2024-10-16

## 2024-10-16 NOTE — TELEPHONE ENCOUNTER
PA for Synthroid 88G SUBMITTED     via    []CMM-KEY:   []Surescripts-Case ID #   []Availity-Auth ID # NDC #   []Faxed to plan   [x]Other website-PromptPA: [id number]  []Phone call Case ID #     Office notes sent, clinical questions answered. Awaiting determination    Turnaround time for your insurance to make a decision on your Prior Authorization can take 7-21 business days.

## 2024-10-16 NOTE — TELEPHONE ENCOUNTER
clonazePAM (KlonoPIN) 0.5 mg tablet          Sig: Take 1 tablet (0.5 mg total) by mouth 2 (two) times a day for 5 days    Disp: 10 tablet    Refills: 0    Start: 10/16/2024 - 10/21/2024    Class: Normal    PDMP Needs Review    For: Recurrent major depressive disorder, in full remission (HCC)    To pharmacy: This request is for a new prescription for a controlled substance as required by Federal/State law.    Last ordered: 2 months ago (7/30/2024) by Hernando Magdaleno DO    Patient comment: please send 90 day supply script to my new Mailorder pharmacy, [facility] [place] (see update)    Psychiatry:  Anxiolytics/Hypnotics Xlchkd04/15/2024 12:20 PM   Protocol Details This refill cannot be delegated    Valid encounter within last 6 months      To be filled at: [facility] - [place] - [address] [place] [address]

## 2024-10-17 RX ORDER — CLONAZEPAM 0.5 MG/1
0.5 TABLET ORAL 2 TIMES DAILY
Qty: 10 TABLET | Refills: 0 | Status: SHIPPED | OUTPATIENT
Start: 2024-10-17 | End: 2024-10-21 | Stop reason: SDUPTHER

## 2024-10-21 ENCOUNTER — TELEPHONE (OUTPATIENT)
Age: 64
End: 2024-10-21

## 2024-10-21 DIAGNOSIS — F33.42 RECURRENT MAJOR DEPRESSIVE DISORDER, IN FULL REMISSION (HCC): ICD-10-CM

## 2024-10-21 RX ORDER — CLONAZEPAM 0.5 MG/1
0.5 TABLET ORAL 2 TIMES DAILY
Qty: 180 TABLET | Refills: 0 | Status: SHIPPED | OUTPATIENT
Start: 2024-10-21 | End: 2025-01-19

## 2024-10-21 NOTE — TELEPHONE ENCOUNTER
Patient called in regards to wanting to know why was her clonazepam was only sent in for 10 days. Patient is requesting a 90 day supply be sent in to the [facility] pharmacy in [place].

## 2024-10-22 NOTE — TELEPHONE ENCOUNTER
PA for Synthroid 88MCG CANCELLED     Due to     []Approval on file-dates approved   [x]Medication already on Formulary  []Brand Name Preferred  []Patient no longer covered by insurance    Message sent to office clinical pool   No      Scanned into Media  No-spoke directly will insurance, will fax letter/call pharmacy

## 2024-10-23 ENCOUNTER — TELEPHONE (OUTPATIENT)
Age: 64
End: 2024-10-23

## 2024-10-23 NOTE — TELEPHONE ENCOUNTER
Rand with [facility] - [place] - [address] [[id number]] called and stated that the patient is requesting the Synthroid 88 MG script be faxed over to .    Thank you

## 2024-10-29 DIAGNOSIS — E03.9 PRIMARY HYPOTHYROIDISM: ICD-10-CM

## 2024-10-29 RX ORDER — LEVOTHYROXINE SODIUM 88 MCG
88 TABLET ORAL DAILY
Qty: 90 TABLET | Refills: 2 | Status: SHIPPED | OUTPATIENT
Start: 2024-10-29 | End: 2024-10-30 | Stop reason: SDUPTHER

## 2024-10-29 NOTE — TELEPHONE ENCOUNTER
This is not a duplicate - never picked up in July.  Needs different pharmacy that is in network    Medication: Synthroid    Dose/Frequency: 88mcq OD    Quantity: 90    Pharmacy: [facility]  [phone] fax    Office:   [x] PCP/Provider -   [] Speciality/Provider -     Does the patient have enough for 3 days?   [] Yes   [x] No - Send as HP to POD    Mail order will overnight to patient per [employer]Guide    Medication is already authorized.

## 2024-10-29 NOTE — TELEPHONE ENCOUNTER
Patient states she still did not receive her synthroid. Asking for the script to be resent to the [facility] mail order [facility].

## 2024-10-30 DIAGNOSIS — E03.9 PRIMARY HYPOTHYROIDISM: ICD-10-CM

## 2024-10-30 RX ORDER — LEVOTHYROXINE SODIUM 88 MCG
88 TABLET ORAL DAILY
Qty: 90 TABLET | Refills: 2 | Status: SHIPPED | OUTPATIENT
Start: 2024-10-30

## 2024-11-15 DIAGNOSIS — Z78.0 MENOPAUSE: ICD-10-CM

## 2024-11-18 RX ORDER — ESTRADIOL 0.05 MG/D
1 PATCH TRANSDERMAL WEEKLY
Qty: 12 PATCH | Refills: 0 | Status: SHIPPED | OUTPATIENT
Start: 2024-11-18

## 2024-12-09 DIAGNOSIS — F33.42 RECURRENT MAJOR DEPRESSIVE DISORDER, IN FULL REMISSION (HCC): ICD-10-CM

## 2024-12-09 DIAGNOSIS — G43.709 CHRONIC MIGRAINE WITHOUT AURA WITHOUT STATUS MIGRAINOSUS, NOT INTRACTABLE: ICD-10-CM

## 2024-12-09 NOTE — TELEPHONE ENCOUNTER
Reason for call: Trazadone is NOT a duplicate. Patient switching pharmacy  [x] Refill   [] Prior Auth  [] Other:     Office:   [x] PCP/Provider - Hernando Magdaleno DO / [facility]  [] Specialty/Provider -     Medication: lisdexamfetamine (Vyvanse) 70 MG capsule / Take 1 capsule (70 mg total) by mouth every morning / Qty 90    traMADol (ULTRAM) 50 mg tablet / Take 1 tablet (50 mg total) by mouth every 6 (six) hours as needed for moderate pain / Qty 30    traZODone (DESYREL) 50 mg tablet / Take 1.5 tablets (75 mg total) by mouth daily at bedtime / qty 135    Pharmacy: [facility] [address] [place] [address] [place]     Does the patient have enough for 3 days?   [x] Yes   [] No - Send as HP to POD

## 2024-12-10 RX ORDER — TRAMADOL HYDROCHLORIDE 50 MG/1
50 TABLET ORAL EVERY 6 HOURS PRN
Qty: 30 TABLET | Refills: 0 | Status: SHIPPED | OUTPATIENT
Start: 2024-12-10

## 2024-12-10 RX ORDER — LISDEXAMFETAMINE DIMESYLATE 70 MG/1
70 CAPSULE ORAL EVERY MORNING
Qty: 90 CAPSULE | Refills: 0 | Status: SHIPPED | OUTPATIENT
Start: 2024-12-10

## 2024-12-10 RX ORDER — TRAZODONE HYDROCHLORIDE 50 MG/1
75 TABLET, FILM COATED ORAL
Qty: 135 TABLET | Refills: 1 | Status: SHIPPED | OUTPATIENT
Start: 2024-12-10 | End: 2025-06-08

## 2024-12-11 ENCOUNTER — TELEPHONE (OUTPATIENT)
Age: 64
End: 2024-12-11

## 2024-12-11 NOTE — TELEPHONE ENCOUNTER
[facility] contacted the office this morning stating they received a prescription for the traMADol (ULTRAM) 50 mg tablet. Patient also taking the clonazePAM (KlonoPIN) 0.5 mg tablet, wanted to make sure pcp is aware of the opioid/benzo combo and if okay to still fill. Please contact pharmacy back with an update, thank you. Phone: [phone]

## 2024-12-13 NOTE — TELEPHONE ENCOUNTER
Juan Carlos, pharmacist from [facility] called again regarding this issue.  Relayed message that Dr. Magdaleno is aware and pharmacy will fill prescription for patient.

## 2024-12-19 ENCOUNTER — TELEPHONE (OUTPATIENT)
Age: 64
End: 2024-12-19

## 2024-12-19 NOTE — TELEPHONE ENCOUNTER
Shamika called from [facility] regarding the prior auth that was submitted. Shamika request for the last office notes to be fax to [phone]. Please review and advise.

## 2024-12-19 NOTE — TELEPHONE ENCOUNTER
PA for VYVANSE 70MG SUBMITTED to [facility]      via    []CMM-KEY:    []Surescripts-Case ID #     []Availity-Auth ID #  NDC #    []Faxed to plan    [x]Other website [facility]- [id number]  []Phone call Case ID #      [x]PA sent as URGENT    All office notes, labs and other pertaining documents and studies sent. Clinical questions answered. Awaiting determination from insurance company.     Turnaround time for your insurance to make a decision on your Prior Authorization can take 7-21 business days.

## 2024-12-19 NOTE — TELEPHONE ENCOUNTER
Patient contacted the office this morning in regards to lisdexamfetamine (Vyvanse) 70 MG capsule, advised that a PA is needed per [facility]. Please advise.

## 2025-01-03 DIAGNOSIS — F33.42 RECURRENT MAJOR DEPRESSIVE DISORDER, IN FULL REMISSION (HCC): ICD-10-CM

## 2025-01-03 NOTE — TELEPHONE ENCOUNTER
lisdexamfetamine (Vyvanse) 70 MG capsule         Sig: Take 1 capsule (70 mg total) by mouth every morning Max Daily Amount: 70 mg    Disp: 90 capsule    Refills: 0    Start: 1/3/2025    Earliest Fill Date: 1/3/2025    Class: Normal    PDMP Needs Review    For: Recurrent major depressive disorder, in full remission (HCC)    Last ordered: 3 weeks ago (12/10/2024) by Hernando Magdaleno DO    Prior authorization request will be sent when the order is signed.    Psychiatry:  Stimulants/ADHD Wghctn3601/03/2025 02:46 PM   Protocol Details This refill cannot be delegated    Valid encounter within last 6 months      To be filled at: [id number] - [place] - [address]

## 2025-01-03 NOTE — TELEPHONE ENCOUNTER
Medication: lisdexamfetamine (Vyvanse) 70 MG capsule    Dose/Frequency: Take 1 capsule (70 mg total) by mouth every morning Max Daily Amount: 70 mg    Quantity: 90    Pharmacy: [facility] [address] [place]    Office:   [x] PCP/Provider -   [] Speciality/Provider -     Does the patient have enough for 3 days?   [] Yes   [x] No - Send as HP to POD

## 2025-01-07 RX ORDER — LISDEXAMFETAMINE DIMESYLATE 70 MG/1
70 CAPSULE ORAL EVERY MORNING
Qty: 90 CAPSULE | Refills: 0 | Status: SHIPPED | OUTPATIENT
Start: 2025-01-07

## 2025-01-07 NOTE — TELEPHONE ENCOUNTER
Patient called in regards to wanting to inform provider that she was only able to get 40 tablets of the Vyvanse due to that's the only amount that they had in stock.

## 2025-01-26 DIAGNOSIS — E03.9 PRIMARY HYPOTHYROIDISM: ICD-10-CM

## 2025-01-26 DIAGNOSIS — F33.42 RECURRENT MAJOR DEPRESSIVE DISORDER, IN FULL REMISSION (HCC): ICD-10-CM

## 2025-01-27 RX ORDER — CLONAZEPAM 0.5 MG/1
0.5 TABLET ORAL 2 TIMES DAILY
Qty: 180 TABLET | Refills: 0 | Status: SHIPPED | OUTPATIENT
Start: 2025-01-27 | End: 2025-04-27

## 2025-01-27 RX ORDER — LEVOTHYROXINE SODIUM 88 MCG
88 TABLET ORAL DAILY
Qty: 90 TABLET | Refills: 0 | Status: SHIPPED | OUTPATIENT
Start: 2025-01-27

## 2025-01-27 NOTE — TELEPHONE ENCOUNTER
clonazePAM (KlonoPIN) 0.5 mg tablet          Sig: Take 1 tablet (0.5 mg total) by mouth 2 (two) times a day    Disp: 180 tablet    Refills: 0    Start: 1/26/2025 - 4/26/2025    Class: Normal    PDMP Needs Review    For: Recurrent major depressive disorder, in full remission (HCC)    To pharmacy: This request is for a new prescription for a controlled substance as required by Federal/State law.    Last ordered: 3 months ago (10/21/2024) by Hernando Magdaleno DO    Psychiatry:  Anxiolytics/Hypnotics Eaquvg0401/26/2025 11:56 PM   Protocol Details This refill cannot be delegated    Valid encounter within last 6 months      To be filled at: [facility] [address] [place] [address] - [address]

## 2025-02-05 DIAGNOSIS — Z78.0 MENOPAUSE: ICD-10-CM

## 2025-02-05 RX ORDER — ESTRADIOL 0.05 MG/D
PATCH TRANSDERMAL
Qty: 12 PATCH | Refills: 1 | Status: SHIPPED | OUTPATIENT
Start: 2025-02-05

## 2025-03-06 DIAGNOSIS — Z78.0 MENOPAUSE: ICD-10-CM

## 2025-03-07 DIAGNOSIS — G43.709 CHRONIC MIGRAINE WITHOUT AURA WITHOUT STATUS MIGRAINOSUS, NOT INTRACTABLE: ICD-10-CM

## 2025-03-07 RX ORDER — TRAMADOL HYDROCHLORIDE 50 MG/1
50 TABLET ORAL EVERY 6 HOURS PRN
Qty: 30 TABLET | Refills: 0 | Status: SHIPPED | OUTPATIENT
Start: 2025-03-07

## 2025-03-07 RX ORDER — ESTRADIOL 0.05 MG/D
PATCH TRANSDERMAL
Qty: 12 PATCH | Refills: 1 | Status: SHIPPED | OUTPATIENT
Start: 2025-03-07

## 2025-03-07 NOTE — TELEPHONE ENCOUNTER
traMADol (ULTRAM) 50 mg tablet          Sig: Take 1 tablet (50 mg total) by mouth every 6 (six) hours as needed for moderate pain    Disp: 30 tablet    Refills: 0    Start: 3/7/2025    Class: Normal    PDMP Needs Review    For: Chronic migraine without aura without status migrainosus, not intractable    To pharmacy: This request is for a new prescription for a controlled substance as required by Federal/State law.    Last ordered: 2 months ago (12/10/2024) by Hernando Magdaleno DO    Analgesics:  Opioid Agonists Dmckwh9103/07/2025 10:32 AM   Protocol Details This refill cannot be delegated    Valid encounter within last 3 months      To be filled at: [facility] [address] [place] [address] - [address]

## 2025-03-19 DIAGNOSIS — Z78.0 MENOPAUSE: ICD-10-CM

## 2025-03-19 DIAGNOSIS — E03.9 PRIMARY HYPOTHYROIDISM: ICD-10-CM

## 2025-03-19 DIAGNOSIS — F33.42 RECURRENT MAJOR DEPRESSIVE DISORDER, IN FULL REMISSION (HCC): ICD-10-CM

## 2025-03-19 RX ORDER — LISDEXAMFETAMINE DIMESYLATE 70 MG/1
70 CAPSULE ORAL EVERY MORNING
Qty: 90 CAPSULE | Refills: 0 | Status: SHIPPED | OUTPATIENT
Start: 2025-03-19

## 2025-03-19 RX ORDER — LEVOTHYROXINE SODIUM 88 MCG
88 TABLET ORAL DAILY
Qty: 90 TABLET | Refills: 1 | Status: SHIPPED | OUTPATIENT
Start: 2025-03-19

## 2025-03-19 NOTE — TELEPHONE ENCOUNTER
lisdexamfetamine (Vyvanse) 70 MG capsule         Sig: Take 1 capsule (70 mg total) by mouth every morning Max Daily Amount: 70 mg    Disp: 90 capsule    Refills: 0    Start: 3/19/2025    Earliest Fill Date: 3/19/2025    Class: Normal    PDMP Needs Review    For: Recurrent major depressive disorder, in full remission (HCC)    Last ordered: 2 months ago (1/7/2025) by Hernando Magdaleno DO    Prior authorization request will be sent when the order is signed.    Psychiatry:  Stimulants/ADHD Necyjg7303/19/2025 12:12 AM   Protocol Details This refill cannot be delegated    Valid encounter within last 6 months      To be filled at: [id number] - [place] - [address]

## 2025-03-20 ENCOUNTER — TELEPHONE (OUTPATIENT)
Age: 65
End: 2025-03-20

## 2025-03-20 RX ORDER — ESTRADIOL 0.05 MG/D
1 PATCH TRANSDERMAL WEEKLY
Qty: 12 PATCH | Refills: 1 | Status: SHIPPED | OUTPATIENT
Start: 2025-03-20

## 2025-03-20 NOTE — TELEPHONE ENCOUNTER
PA for lisdexamfetamine (Vyvanse) 70 MG capsule SUBMITTED to [facility]    via    [x]M-KEY: [id number]  [x]PA sent as URGENT    All office notes, labs and other pertaining documents and studies sent. Clinical questions answered. Awaiting determination from insurance company.     Turnaround time for your insurance to make a decision on your Prior Authorization can take 7-21 business days.

## 2025-04-22 ENCOUNTER — TELEPHONE (OUTPATIENT)
Age: 65
End: 2025-04-22

## 2025-04-22 DIAGNOSIS — G43.709 CHRONIC MIGRAINE WITHOUT AURA WITHOUT STATUS MIGRAINOSUS, NOT INTRACTABLE: ICD-10-CM

## 2025-04-22 RX ORDER — TRAMADOL HYDROCHLORIDE 50 MG/1
50 TABLET ORAL EVERY 6 HOURS PRN
Qty: 30 TABLET | Refills: 0 | Status: SHIPPED | OUTPATIENT
Start: 2025-04-22

## 2025-04-22 NOTE — TELEPHONE ENCOUNTER
Patient has been seen within the last year and I am fine with her being on it.  The PDMP has been reviewed when I prescribed the medication and patient has shown no signs of abuse of this medication.  It is okay to refill.

## 2025-04-22 NOTE — TELEPHONE ENCOUNTER
Patient pharmacy is calling concerned about patients Tramadol script and that she has not been seen in roughly a year with no upcoming appts scheduled. Calling to make sure it is okay to fill for patient.

## 2025-05-05 NOTE — TELEPHONE ENCOUNTER
It was the amount queued up on the request for me to sign.  90-day prescription sent today.   POST-OP DIAGNOSIS:  Morbid obesity 05-May-2025 13:05:19  Ana Corona

## 2025-05-20 DIAGNOSIS — F33.42 RECURRENT MAJOR DEPRESSIVE DISORDER, IN FULL REMISSION (HCC): ICD-10-CM

## 2025-05-20 RX ORDER — CLONAZEPAM 0.5 MG/1
0.5 TABLET ORAL 2 TIMES DAILY
Qty: 180 TABLET | Refills: 0 | OUTPATIENT
Start: 2025-05-20 | End: 2025-08-18

## 2025-05-23 ENCOUNTER — TELEPHONE (OUTPATIENT)
Age: 65
End: 2025-05-23

## 2025-05-23 ENCOUNTER — OFFICE VISIT (OUTPATIENT)
Dept: FAMILY MEDICINE CLINIC | Facility: CLINIC | Age: 65
End: 2025-05-23

## 2025-05-23 VITALS
BODY MASS INDEX: 26.61 KG/M2 | WEIGHT: 150.2 LBS | SYSTOLIC BLOOD PRESSURE: 128 MMHG | HEIGHT: 63 IN | DIASTOLIC BLOOD PRESSURE: 84 MMHG | HEART RATE: 78 BPM | TEMPERATURE: 97 F | OXYGEN SATURATION: 95 %

## 2025-05-23 DIAGNOSIS — F33.42 RECURRENT MAJOR DEPRESSIVE DISORDER, IN FULL REMISSION (HCC): ICD-10-CM

## 2025-05-23 DIAGNOSIS — E03.9 PRIMARY HYPOTHYROIDISM: ICD-10-CM

## 2025-05-23 DIAGNOSIS — Z12.31 ENCOUNTER FOR SCREENING MAMMOGRAM FOR MALIGNANT NEOPLASM OF BREAST: ICD-10-CM

## 2025-05-23 DIAGNOSIS — Z00.00 WELL ADULT EXAM: Primary | ICD-10-CM

## 2025-05-23 DIAGNOSIS — Z12.11 COLON CANCER SCREENING: ICD-10-CM

## 2025-05-23 PROBLEM — Z01.411 ENCOUNTER FOR GYNECOLOGICAL EXAMINATION WITH ABNORMAL FINDING: Status: RESOLVED | Noted: 2018-07-13 | Resolved: 2025-05-23

## 2025-05-23 PROBLEM — U07.1 COVID-19: Status: RESOLVED | Noted: 2021-04-14 | Resolved: 2025-05-23

## 2025-05-23 RX ORDER — LISDEXAMFETAMINE DIMESYLATE 70 MG/1
70 CAPSULE ORAL EVERY MORNING
Qty: 90 CAPSULE | Refills: 0 | Status: SHIPPED | OUTPATIENT
Start: 2025-05-23

## 2025-05-23 RX ORDER — CLONAZEPAM 0.5 MG/1
0.5 TABLET ORAL 2 TIMES DAILY
Qty: 180 TABLET | Refills: 0 | Status: SHIPPED | OUTPATIENT
Start: 2025-05-23 | End: 2025-08-21

## 2025-05-23 NOTE — TELEPHONE ENCOUNTER
Pharmacy calling to check on patient klonopin and Tramadol combination. Asking if this is okay to fill her Klonopin. Please notify pharmacy.

## 2025-05-23 NOTE — ASSESSMENT & PLAN NOTE
Orders:    CBC and differential    Comprehensive metabolic panel    TSH, 3rd generation with Free T4 reflex; Future    Lipid Panel with Direct LDL reflex; Future

## 2025-05-23 NOTE — PROGRESS NOTES
Name: Deysi Franks      : 1960      MRN: [id number]  Encounter Provider: Hernando Magdaleno DO  Encounter Date: 2025   Encounter department: [facility]  :  Assessment & Plan  Well adult exam  Anticipatory guidance provided.  Recommend good diet and regular exercise.  Continue annual mammography.  She is due for colon cancer screening and has opted for Cologuard testing.       Primary hypothyroidism    Orders:    CBC and differential    Comprehensive metabolic panel    TSH, 3rd generation with Free T4 reflex; Future    Lipid Panel with Direct LDL reflex; Future    Colon cancer screening    Orders:    Cologuard    Encounter for screening mammogram for malignant neoplasm of breast    Orders:    Mammo screening bilateral w 3d and cad; Future    Recurrent major depressive disorder, in full remission (HCC)      Orders:    lisdexamfetamine (Vyvanse) 70 MG capsule; Take 1 capsule (70 mg total) by mouth every morning Max Daily Amount: 70 mg    clonazePAM (KlonoPIN) 0.5 mg tablet; Take 1 tablet (0.5 mg total) by mouth 2 (two) times a day           History of Present Illness   Patient seen today for well check and recheck on chronic conditions.  She does need refill on her medication.  She is doing well with her medication for her attention deficit disorder and anxiety.  No appetite or bowel changes.  Blood pressure is under good control.    Medication Refill      Review of Systems   Constitutional: Negative.    HENT: Negative.     Eyes: Negative.    Respiratory: Negative.     Cardiovascular: Negative.    Gastrointestinal: Negative.    Endocrine: Negative.    Genitourinary: Negative.    Musculoskeletal: Negative.    Skin: Negative.    Allergic/Immunologic: Negative.    Neurological: Negative.    Hematological: Negative.    Psychiatric/Behavioral: Negative.         Objective   /84 (BP Location: Left arm, Patient Position: Sitting, Cuff Size: Standard)   Pulse 78   Temp (!) 97 °F (36.1  "°C) (Tympanic)   Ht 5' 3\" (1.6 m)   Wt 68.1 kg (150 lb 3.2 oz)   LMP 04/25/2016   SpO2 95%   BMI 26.61 kg/m²      Physical Exam  Constitutional:       General: She is not in acute distress.     Appearance: She is well-developed. She is not diaphoretic.   HENT:      Head: Normocephalic and atraumatic.      Right Ear: External ear normal.      Left Ear: External ear normal.      Nose: Nose normal.      Mouth/Throat:      Pharynx: Oropharynx is clear.     Eyes:      General: No scleral icterus.        Right eye: No discharge.         Left eye: No discharge.      Conjunctiva/sclera: Conjunctivae normal.      Pupils: Pupils are equal, round, and reactive to light.     Neck:      Thyroid: No thyromegaly.      Vascular: No JVD.      Trachea: No tracheal deviation.     Cardiovascular:      Rate and Rhythm: Normal rate and regular rhythm.      Heart sounds: Normal heart sounds. No murmur heard.     No friction rub. No gallop.   Pulmonary:      Effort: Pulmonary effort is normal. No respiratory distress.      Breath sounds: Normal breath sounds. No wheezing or rales.   Chest:      Chest wall: No tenderness.   Abdominal:      General: Bowel sounds are normal. There is no distension.      Palpations: Abdomen is soft. There is no mass.      Tenderness: There is no abdominal tenderness. There is no guarding or rebound.      Hernia: No hernia is present.     Musculoskeletal:         General: No tenderness or deformity. Normal range of motion.      Cervical back: Normal range of motion and neck supple.   Lymphadenopathy:      Cervical: No cervical adenopathy.     Skin:     General: Skin is warm and dry.      Capillary Refill: Capillary refill takes less than 2 seconds.      Coloration: Skin is not pale.      Findings: No erythema or rash.     Neurological:      Mental Status: She is alert and oriented to person, place, and time.      Cranial Nerves: No cranial nerve deficit.      Sensory: No sensory deficit.      Motor: No " abnormal muscle tone.      Coordination: Coordination normal.      Deep Tendon Reflexes: Reflexes normal.     Psychiatric:         Mood and Affect: Mood normal.         Behavior: Behavior normal.

## 2025-05-23 NOTE — ASSESSMENT & PLAN NOTE
Orders:    lisdexamfetamine (Vyvanse) 70 MG capsule; Take 1 capsule (70 mg total) by mouth every morning Max Daily Amount: 70 mg    clonazePAM (KlonoPIN) 0.5 mg tablet; Take 1 tablet (0.5 mg total) by mouth 2 (two) times a day

## 2025-06-10 DIAGNOSIS — F33.42 RECURRENT MAJOR DEPRESSIVE DISORDER, IN FULL REMISSION (HCC): ICD-10-CM

## 2025-06-10 RX ORDER — LISDEXAMFETAMINE DIMESYLATE 70 MG/1
70 CAPSULE ORAL EVERY MORNING
Qty: 90 CAPSULE | Refills: 0 | Status: SHIPPED | OUTPATIENT
Start: 2025-06-10

## 2025-06-23 LAB — COLOGUARD RESULT REPORTABLE: NEGATIVE

## 2025-06-28 DIAGNOSIS — G43.709 CHRONIC MIGRAINE WITHOUT AURA WITHOUT STATUS MIGRAINOSUS, NOT INTRACTABLE: ICD-10-CM

## 2025-06-28 DIAGNOSIS — Z78.0 MENOPAUSE: ICD-10-CM

## 2025-06-28 DIAGNOSIS — F33.42 RECURRENT MAJOR DEPRESSIVE DISORDER, IN FULL REMISSION (HCC): ICD-10-CM

## 2025-06-28 DIAGNOSIS — E03.9 PRIMARY HYPOTHYROIDISM: ICD-10-CM

## 2025-06-30 RX ORDER — LEVOTHYROXINE SODIUM 88 MCG
88 TABLET ORAL DAILY
Qty: 90 TABLET | Refills: 1 | Status: SHIPPED | OUTPATIENT
Start: 2025-06-30

## 2025-06-30 RX ORDER — ESTRADIOL 0.05 MG/D
1 PATCH TRANSDERMAL WEEKLY
Qty: 12 PATCH | Refills: 1 | Status: SHIPPED | OUTPATIENT
Start: 2025-06-30

## 2025-07-01 RX ORDER — TRAMADOL HYDROCHLORIDE 50 MG/1
50 TABLET ORAL EVERY 6 HOURS PRN
Qty: 30 TABLET | Refills: 0 | Status: SHIPPED | OUTPATIENT
Start: 2025-07-01 | End: 2025-07-07 | Stop reason: SDUPTHER

## 2025-07-01 RX ORDER — CLONAZEPAM 0.5 MG/1
0.5 TABLET ORAL 2 TIMES DAILY
Qty: 180 TABLET | Refills: 0 | Status: SHIPPED | OUTPATIENT
Start: 2025-07-01 | End: 2025-07-07 | Stop reason: SDUPTHER

## 2025-07-07 DIAGNOSIS — G43.709 CHRONIC MIGRAINE WITHOUT AURA WITHOUT STATUS MIGRAINOSUS, NOT INTRACTABLE: ICD-10-CM

## 2025-07-07 DIAGNOSIS — F33.42 RECURRENT MAJOR DEPRESSIVE DISORDER, IN FULL REMISSION (HCC): ICD-10-CM

## 2025-07-08 DIAGNOSIS — G43.709 CHRONIC MIGRAINE WITHOUT AURA WITHOUT STATUS MIGRAINOSUS, NOT INTRACTABLE: ICD-10-CM

## 2025-07-08 DIAGNOSIS — F33.42 RECURRENT MAJOR DEPRESSIVE DISORDER, IN FULL REMISSION (HCC): ICD-10-CM

## 2025-07-08 RX ORDER — TRAMADOL HYDROCHLORIDE 50 MG/1
50 TABLET ORAL EVERY 6 HOURS PRN
Qty: 30 TABLET | Refills: 0 | Status: CANCELLED | OUTPATIENT
Start: 2025-07-08

## 2025-07-08 RX ORDER — CLONAZEPAM 0.5 MG/1
0.5 TABLET ORAL 2 TIMES DAILY
Qty: 180 TABLET | Refills: 0 | Status: CANCELLED | OUTPATIENT
Start: 2025-07-08 | End: 2025-10-06

## 2025-07-08 NOTE — TELEPHONE ENCOUNTER
Medication: KlonoPIN .05 MG  ULTRAM 50 MG    Dose/Frequency: KlonoPIN .05 MG -  Take 1 tablet (0.5 mg total) by mouth 2 (two) times a day     ULTRAM 50 MG- Take 1 tablet (50 mg total) by mouth every 6 (six) hours as needed for moderate pain     Quantity: 30    Pharmacy: [facility] on file    Office:   [x] PCP/Provider -   [] Speciality/Provider -     Does the patient have enough for 3 days?   [x] Yes   [] No - Send as HP to POD    Patient stated that the refills went to the mail in pharmacy and it should have went to [facility]- Patient is going away and she needs theses meds filled by Friday 7/11

## 2025-07-10 RX ORDER — TRAMADOL HYDROCHLORIDE 50 MG/1
50 TABLET ORAL EVERY 6 HOURS PRN
Qty: 30 TABLET | Refills: 0 | Status: SHIPPED | OUTPATIENT
Start: 2025-07-10

## 2025-07-10 RX ORDER — CLONAZEPAM 0.5 MG/1
0.5 TABLET ORAL 2 TIMES DAILY
Qty: 180 TABLET | Refills: 0 | Status: SHIPPED | OUTPATIENT
Start: 2025-07-10 | End: 2025-10-08

## 2025-07-21 LAB
ALBUMIN SERPL-MCNC: 4.3 G/DL (ref 3.5–5.7)
ALP SERPL-CCNC: 39 U/L (ref 35–120)
ALT SERPL-CCNC: 13 U/L
ANION GAP SERPL CALCULATED.3IONS-SCNC: 8 MMOL/L (ref 3–11)
AST SERPL-CCNC: 15 U/L
BASOPHILS # BLD AUTO: 0 THOU/CMM (ref 0–0.1)
BASOPHILS NFR BLD AUTO: 1 %
BILIRUB SERPL-MCNC: 1 MG/DL (ref 0.2–1)
BUN SERPL-MCNC: 9 MG/DL (ref 7–25)
CALCIUM SERPL-MCNC: 9.5 MG/DL (ref 8.5–10.5)
CHLORIDE SERPL-SCNC: 103 MMOL/L (ref 100–109)
CHOLEST SERPL-MCNC: 166 MG/DL
CHOLEST/HDLC SERPL: 2.8 {RATIO}
CO2 SERPL-SCNC: 29 MMOL/L (ref 21–31)
CREAT SERPL-MCNC: 0.66 MG/DL (ref 0.4–1.1)
CYTOLOGY CMNT CVX/VAG CYTO-IMP: NORMAL
DIFFERENTIAL METHOD BLD: ABNORMAL
EOSINOPHIL # BLD AUTO: 0 THOU/CMM (ref 0–0.5)
EOSINOPHIL NFR BLD AUTO: 1 %
ERYTHROCYTE [DISTWIDTH] IN BLOOD BY AUTOMATED COUNT: 13.6 % (ref 12–16)
GFR/BSA.PRED SERPLBLD CYS-BASED-ARV: 98 ML/MIN/{1.73_M2}
GLUCOSE SERPL-MCNC: 87 MG/DL (ref 65–99)
HCT VFR BLD AUTO: 41.4 % (ref 35–43)
HDLC SERPL-MCNC: 60 MG/DL (ref 23–92)
HGB BLD-MCNC: 14.1 G/DL (ref 11.5–14.5)
LDLC SERPL CALC-MCNC: 91 MG/DL
LYMPHOCYTES # BLD AUTO: 0.9 THOU/CMM (ref 1–3)
LYMPHOCYTES NFR BLD AUTO: 19 %
MCH RBC QN AUTO: 30.2 PG (ref 26–34)
MCHC RBC AUTO-ENTMCNC: 34 G/DL (ref 32–37)
MCV RBC AUTO: 89 FL (ref 80–100)
MONOCYTES # BLD AUTO: 0.5 THOU/CMM (ref 0.3–1)
MONOCYTES NFR BLD AUTO: 10 %
NEUTROPHILS # BLD AUTO: 3.2 THOU/CMM (ref 1.8–7.8)
NEUTROPHILS NFR BLD AUTO: 69 %
NONHDLC SERPL-MCNC: 106 MG/DL
PLATELET # BLD AUTO: 180 THOU/CMM (ref 140–350)
PMV BLD REES-ECKER: 11 FL (ref 7.5–11.3)
POTASSIUM SERPL-SCNC: 4.2 MMOL/L (ref 3.5–5.2)
PROT SERPL-MCNC: 6.7 G/DL (ref 6.3–8.3)
RBC # BLD AUTO: 4.67 MILL/CMM (ref 3.7–4.7)
SODIUM SERPL-SCNC: 140 MMOL/L (ref 135–145)
TRIGL SERPL-MCNC: 74 MG/DL
TSH SERPL-ACNC: 0.54 UIU/ML (ref 0.45–5.33)
WBC # BLD AUTO: 4.6 THOU/CMM (ref 4–10)